# Patient Record
Sex: FEMALE | Race: WHITE | Employment: OTHER | URBAN - METROPOLITAN AREA
[De-identification: names, ages, dates, MRNs, and addresses within clinical notes are randomized per-mention and may not be internally consistent; named-entity substitution may affect disease eponyms.]

---

## 2017-03-20 RX ORDER — ATORVASTATIN CALCIUM 20 MG/1
TABLET, FILM COATED ORAL
Qty: 30 TABLET | Refills: 1 | Status: SHIPPED | OUTPATIENT
Start: 2017-03-20 | End: 2017-05-17 | Stop reason: SDUPTHER

## 2017-05-17 RX ORDER — ATORVASTATIN CALCIUM 20 MG/1
TABLET, FILM COATED ORAL
Qty: 30 TABLET | Refills: 0 | Status: SHIPPED | OUTPATIENT
Start: 2017-05-17 | End: 2017-05-19 | Stop reason: SDUPTHER

## 2017-05-19 ENCOUNTER — OFFICE VISIT (OUTPATIENT)
Dept: FAMILY MEDICINE CLINIC | Age: 62
End: 2017-05-19

## 2017-05-19 VITALS
DIASTOLIC BLOOD PRESSURE: 70 MMHG | HEART RATE: 72 BPM | SYSTOLIC BLOOD PRESSURE: 110 MMHG | WEIGHT: 144 LBS | BODY MASS INDEX: 24.59 KG/M2 | HEIGHT: 64 IN | OXYGEN SATURATION: 98 % | RESPIRATION RATE: 14 BRPM

## 2017-05-19 DIAGNOSIS — E78.5 HYPERLIPIDEMIA WITH TARGET LDL LESS THAN 100: ICD-10-CM

## 2017-05-19 DIAGNOSIS — Z00.00 WELL ADULT EXAM: Primary | ICD-10-CM

## 2017-05-19 PROCEDURE — 99396 PREV VISIT EST AGE 40-64: CPT | Performed by: FAMILY MEDICINE

## 2017-05-19 RX ORDER — ATORVASTATIN CALCIUM 20 MG/1
TABLET, FILM COATED ORAL
Qty: 30 TABLET | Refills: 11 | Status: SHIPPED | OUTPATIENT
Start: 2017-05-19 | End: 2018-04-30 | Stop reason: SDUPTHER

## 2017-05-19 ASSESSMENT — PATIENT HEALTH QUESTIONNAIRE - PHQ9
1. LITTLE INTEREST OR PLEASURE IN DOING THINGS: 0
SUM OF ALL RESPONSES TO PHQ9 QUESTIONS 1 & 2: 0
2. FEELING DOWN, DEPRESSED OR HOPELESS: 0
SUM OF ALL RESPONSES TO PHQ QUESTIONS 1-9: 0

## 2017-05-19 ASSESSMENT — ENCOUNTER SYMPTOMS
RESPIRATORY NEGATIVE: 1
GASTROINTESTINAL NEGATIVE: 1

## 2017-12-06 ENCOUNTER — HOSPITAL ENCOUNTER (OUTPATIENT)
Dept: WOMENS IMAGING | Age: 62
Discharge: HOME | End: 2017-12-06
Payer: COMMERCIAL

## 2017-12-06 DIAGNOSIS — Z12.31 VISIT FOR SCREENING MAMMOGRAM: ICD-10-CM

## 2017-12-06 PROCEDURE — 77063 BREAST TOMOSYNTHESIS BI: CPT

## 2018-04-30 ENCOUNTER — OFFICE VISIT (OUTPATIENT)
Dept: FAMILY MEDICINE CLINIC | Age: 63
End: 2018-04-30
Payer: COMMERCIAL

## 2018-04-30 VITALS
SYSTOLIC BLOOD PRESSURE: 118 MMHG | OXYGEN SATURATION: 98 % | RESPIRATION RATE: 16 BRPM | HEART RATE: 72 BPM | HEIGHT: 64 IN | BODY MASS INDEX: 24.01 KG/M2 | WEIGHT: 140.6 LBS | DIASTOLIC BLOOD PRESSURE: 70 MMHG

## 2018-04-30 DIAGNOSIS — Z00.00 WELL ADULT HEALTH CHECK: Primary | ICD-10-CM

## 2018-04-30 PROCEDURE — 99396 PREV VISIT EST AGE 40-64: CPT | Performed by: FAMILY MEDICINE

## 2018-04-30 RX ORDER — ATORVASTATIN CALCIUM 20 MG/1
TABLET, FILM COATED ORAL
Qty: 30 TABLET | Refills: 11 | Status: SHIPPED | OUTPATIENT
Start: 2018-04-30 | End: 2019-05-22 | Stop reason: SDUPTHER

## 2018-04-30 ASSESSMENT — ENCOUNTER SYMPTOMS
GASTROINTESTINAL NEGATIVE: 1
RESPIRATORY NEGATIVE: 1

## 2019-05-22 ENCOUNTER — OFFICE VISIT (OUTPATIENT)
Dept: FAMILY MEDICINE CLINIC | Age: 64
End: 2019-05-22
Payer: MEDICARE

## 2019-05-22 VITALS
DIASTOLIC BLOOD PRESSURE: 84 MMHG | RESPIRATION RATE: 12 BRPM | SYSTOLIC BLOOD PRESSURE: 136 MMHG | HEART RATE: 84 BPM | WEIGHT: 144.6 LBS | HEIGHT: 64 IN | BODY MASS INDEX: 24.69 KG/M2

## 2019-05-22 DIAGNOSIS — Z00.00 WELL ADULT HEALTH CHECK: Primary | ICD-10-CM

## 2019-05-22 PROCEDURE — G0439 PPPS, SUBSEQ VISIT: HCPCS | Performed by: FAMILY MEDICINE

## 2019-05-22 RX ORDER — ATORVASTATIN CALCIUM 20 MG/1
TABLET, FILM COATED ORAL
Qty: 30 TABLET | Refills: 11 | Status: SHIPPED | OUTPATIENT
Start: 2019-05-22 | End: 2020-04-02 | Stop reason: SDUPTHER

## 2019-05-22 ASSESSMENT — PATIENT HEALTH QUESTIONNAIRE - PHQ9
SUM OF ALL RESPONSES TO PHQ QUESTIONS 1-9: 0
SUM OF ALL RESPONSES TO PHQ9 QUESTIONS 1 & 2: 0
2. FEELING DOWN, DEPRESSED OR HOPELESS: 0
1. LITTLE INTEREST OR PLEASURE IN DOING THINGS: 0
SUM OF ALL RESPONSES TO PHQ QUESTIONS 1-9: 0

## 2019-05-22 ASSESSMENT — ENCOUNTER SYMPTOMS
RESPIRATORY NEGATIVE: 1
GASTROINTESTINAL NEGATIVE: 1

## 2019-05-22 NOTE — PROGRESS NOTES
Subjective:      Patient ID: Severo Alstrom is a 58 y.o. female. HPI:    Chief Complaint   Patient presents with    Annual Exam    Medication Refill    Results     Annual eval.  Doing very well overall. BP fine. BP Readings from Last 3 Encounters:   19 136/84   18 118/70   17 110/70     Weight stable. Wt Readings from Last 3 Encounters:   19 144 lb 9.6 oz (65.6 kg)   18 140 lb 9.6 oz (63.8 kg)   17 144 lb (65.3 kg)     Tolerating meds, compliant. Patient Active Problem List   Diagnosis    Hyperlipidemia with target LDL less than 100     Past Surgical History:   Procedure Laterality Date     SECTION       Prior to Admission medications    Medication Sig Start Date End Date Taking?  Authorizing Provider   atorvastatin (LIPITOR) 20 MG tablet TAKE 1 TABLET BY MOUTH AT NIGHT 18  Yes Yarelis Davis,        Lab Results   Component Value Date    LABA1C 5.9 2014     No results found for: EAG    No components found for: CHLPL  Lab Results   Component Value Date    TRIG 77 2019    TRIG 59 2018    TRIG 73 2017     Lab Results   Component Value Date    HDL 92 2019    HDL 99 2018     2017     Lab Results   Component Value Date    LDLCALC 69 2019    LDLCALC 66 2018    LDLCALC 39 2017     No results found for: LABVLDL      Chemistry        Component Value Date/Time     2019 0630    K 3.5 (L) 2019 0630     (L) 2019 0630    CO2 28 2019 0630    BUN 20 2019 0630    CREATININE 0.90 2019 0630        Component Value Date/Time    CALCIUM 9.6 2019 0630    ALKPHOS 52 2019 0630    AST 24 2019 0630    ALT 20 2019 0630    BILITOT 1.6 (H) 2019 0630            Lab Results   Component Value Date    TSH 2.900 10/06/2015       Lab Results   Component Value Date    WBC 5.5 2019    HGB 14.8 2019    HCT 44.2 (H) RTO annually        Earlene Robbins DO

## 2020-01-14 ENCOUNTER — HOSPITAL ENCOUNTER (OUTPATIENT)
Dept: WOMENS IMAGING | Age: 65
Discharge: HOME | End: 2020-01-14
Payer: MEDICARE

## 2020-01-14 ENCOUNTER — TELEPHONE (OUTPATIENT)
Dept: FAMILY MEDICINE CLINIC | Age: 65
End: 2020-01-14

## 2020-01-14 PROCEDURE — 77063 BREAST TOMOSYNTHESIS BI: CPT

## 2020-01-14 NOTE — TELEPHONE ENCOUNTER
Per Dr Iggy Servin, patient needs appt to discuss mammogram due to implants looking abnormal.    I spoke with patient and appt given

## 2020-01-16 ENCOUNTER — OFFICE VISIT (OUTPATIENT)
Dept: FAMILY MEDICINE CLINIC | Age: 65
End: 2020-01-16
Payer: MEDICARE

## 2020-01-16 VITALS
HEIGHT: 64 IN | BODY MASS INDEX: 24.87 KG/M2 | RESPIRATION RATE: 20 BRPM | HEART RATE: 96 BPM | SYSTOLIC BLOOD PRESSURE: 128 MMHG | WEIGHT: 145.7 LBS | DIASTOLIC BLOOD PRESSURE: 68 MMHG

## 2020-01-16 PROCEDURE — G8427 DOCREV CUR MEDS BY ELIG CLIN: HCPCS | Performed by: FAMILY MEDICINE

## 2020-01-16 PROCEDURE — 1123F ACP DISCUSS/DSCN MKR DOCD: CPT | Performed by: FAMILY MEDICINE

## 2020-01-16 PROCEDURE — 99213 OFFICE O/P EST LOW 20 MIN: CPT | Performed by: FAMILY MEDICINE

## 2020-01-16 PROCEDURE — 4040F PNEUMOC VAC/ADMIN/RCVD: CPT | Performed by: FAMILY MEDICINE

## 2020-01-16 PROCEDURE — G8484 FLU IMMUNIZE NO ADMIN: HCPCS | Performed by: FAMILY MEDICINE

## 2020-01-16 PROCEDURE — 1090F PRES/ABSN URINE INCON ASSESS: CPT | Performed by: FAMILY MEDICINE

## 2020-01-16 PROCEDURE — G8417 CALC BMI ABV UP PARAM F/U: HCPCS | Performed by: FAMILY MEDICINE

## 2020-01-16 PROCEDURE — 1036F TOBACCO NON-USER: CPT | Performed by: FAMILY MEDICINE

## 2020-01-16 PROCEDURE — 3017F COLORECTAL CA SCREEN DOC REV: CPT | Performed by: FAMILY MEDICINE

## 2020-01-16 PROCEDURE — G8400 PT W/DXA NO RESULTS DOC: HCPCS | Performed by: FAMILY MEDICINE

## 2020-01-16 SDOH — ECONOMIC STABILITY: TRANSPORTATION INSECURITY
IN THE PAST 12 MONTHS, HAS THE LACK OF TRANSPORTATION KEPT YOU FROM MEDICAL APPOINTMENTS OR FROM GETTING MEDICATIONS?: NO

## 2020-01-16 SDOH — ECONOMIC STABILITY: TRANSPORTATION INSECURITY
IN THE PAST 12 MONTHS, HAS LACK OF TRANSPORTATION KEPT YOU FROM MEETINGS, WORK, OR FROM GETTING THINGS NEEDED FOR DAILY LIVING?: NO

## 2020-01-16 SDOH — ECONOMIC STABILITY: FOOD INSECURITY: WITHIN THE PAST 12 MONTHS, THE FOOD YOU BOUGHT JUST DIDN'T LAST AND YOU DIDN'T HAVE MONEY TO GET MORE.: NEVER TRUE

## 2020-01-16 SDOH — ECONOMIC STABILITY: FOOD INSECURITY: WITHIN THE PAST 12 MONTHS, YOU WORRIED THAT YOUR FOOD WOULD RUN OUT BEFORE YOU GOT MONEY TO BUY MORE.: NEVER TRUE

## 2020-01-16 SDOH — ECONOMIC STABILITY: INCOME INSECURITY: HOW HARD IS IT FOR YOU TO PAY FOR THE VERY BASICS LIKE FOOD, HOUSING, MEDICAL CARE, AND HEATING?: NOT HARD AT ALL

## 2020-01-16 ASSESSMENT — PATIENT HEALTH QUESTIONNAIRE - PHQ9
SUM OF ALL RESPONSES TO PHQ9 QUESTIONS 1 & 2: 0
SUM OF ALL RESPONSES TO PHQ QUESTIONS 1-9: 0
SUM OF ALL RESPONSES TO PHQ QUESTIONS 1-9: 0
2. FEELING DOWN, DEPRESSED OR HOPELESS: 0
1. LITTLE INTEREST OR PLEASURE IN DOING THINGS: 0

## 2020-01-16 ASSESSMENT — ENCOUNTER SYMPTOMS
RESPIRATORY NEGATIVE: 1
GASTROINTESTINAL NEGATIVE: 1

## 2020-01-16 NOTE — PROGRESS NOTES
I spoke with Rocío Noe at Dr. Jhony Spencer office [phone] and pt is to call them herself to schedule the consult for breast implant removal. She has to pay $100 consult fee prior to being put on their schedule. No referral is needed.

## 2020-01-16 NOTE — PROGRESS NOTES
Subjective:      Patient ID: Skyler rOr is a 71 y.o. female. HPI:    Chief Complaint   Patient presents with    Results     mammogram     Pt here to discuss recent mammogram.  Mammogram showed possible intracapsular rupture of both implants. Implants were placed back in  by a surgeon in [place]. Pt does admit to some pain in the breast and some asymmetry. Patient Active Problem List   Diagnosis    Hyperlipidemia with target LDL less than 100     Past Surgical History:   Procedure Laterality Date     SECTION       Prior to Admission medications    Medication Sig Start Date End Date Taking? Authorizing Provider   atorvastatin (LIPITOR) 20 MG tablet TAKE 1 TABLET BY MOUTH AT NIGHT 19  Yes Carmine Caruso DO         Review of Systems   Constitutional: Negative. HENT: Negative. Respiratory: Negative. Cardiovascular: Negative. Gastrointestinal: Negative. Musculoskeletal: Negative. All other systems reviewed and are negative. Objective:   Physical Exam  Vitals signs and nursing note reviewed. Constitutional:       Appearance: She is well-developed. HENT:      Head: Normocephalic and atraumatic. Right Ear: Tympanic membrane normal.      Left Ear: Tympanic membrane normal.   Cardiovascular:      Rate and Rhythm: Normal rate and regular rhythm. Heart sounds: Normal heart sounds. No murmur. Pulmonary:      Effort: Pulmonary effort is normal.      Breath sounds: Normal breath sounds. Abdominal:      General: Bowel sounds are normal.      Palpations: Abdomen is soft. Skin:     General: Skin is warm and dry. Neurological:      Mental Status: She is alert and oriented to person, place, and time. Psychiatric:         Behavior: Behavior normal.         Assessment:       Diagnosis Orders   1.  Breast implant rupture, initial encounter             Plan:      -  Mammogram reviewed with pt  -  She will call Dr. Jose Miguel Ruth to set up a

## 2020-01-16 NOTE — PROGRESS NOTES
Visit Information    Have you changed or started any medications since your last visit including any over-the-counter medicines, vitamins, or herbal medicines? no   Are you having any side effects from any of your medications? -  no  Have you stopped taking any of your medications? Is so, why? -  no    Have you seen any other physician or provider since your last visit? No  Have you had any other diagnostic tests since your last visit? Yes - Records Obtained  Have you been seen in the emergency room and/or had an admission to a hospital since we last saw you? No  Have you had your routine dental cleaning in the past 6 months? yes - 1/7/2020    Have you activated your [facility] account? If not, what are your barriers?  Yes     Patient Care Team:  Nilda Whiteside DO as PCP - General (Family Medicine)  Nilda Whiteside DO as PCP - [facility] Provider  Derrell Mccrary MD (Dermatology)    Medical History Review  Past Medical, Family, and Social History reviewed and does not contribute to the patient presenting condition    Health Maintenance   Topic Date Due    Hepatitis C screen  1955    HIV screen  11/29/1969    Shingles Vaccine (2 of 3) 11/25/2015    A1C test (Diabetic or Prediabetic)  05/14/2016    Cervical cancer screen  05/07/2018    Flu vaccine (1) 09/01/2019    Pneumococcal 65+ years Vaccine (1 of 1 - PPSV23) 11/29/2019    DEXA (modify frequency per FRAX score)  11/29/2019    Lipid screen  04/06/2020    Annual Wellness Visit (AWV)  05/21/2020    Breast cancer screen  01/14/2022    DTaP/Tdap/Td vaccine (2 - Td) 09/30/2025    Colon cancer screen colonoscopy  05/24/2028

## 2020-01-21 ENCOUNTER — TELEPHONE (OUTPATIENT)
Dept: FAMILY MEDICINE CLINIC | Age: 65
End: 2020-01-21

## 2020-01-21 NOTE — TELEPHONE ENCOUNTER
Per Dr. Claudia Andujar patient is self pay and wishes to go to [facility]. [place] [id number] for MRI bilateral breast w and wo contrast.  CS spoke with Karina MRI breast is done at imaging center which is affiliated with [facility].  Imaging center address [address] [facility]. [address] [place] [address]. MRI is scheduled for 1/28/2020 12 noon arrival for 12:30pm  MRI bilateral breast.  Orders, OV notes and Mammogram faxed to the [address] [place] [address] at [phone]. Patient notified, voiced understanding.

## 2020-03-03 ENCOUNTER — HOSPITAL ENCOUNTER (OUTPATIENT)
Age: 65
Discharge: HOME | End: 2020-03-03
Payer: MEDICARE

## 2020-03-03 LAB
ANION GAP SERPL CALCULATED.3IONS-SCNC: 13 MEQ/L (ref 8–16)
BUN BLDV-MCNC: 23 MG/DL (ref 7–22)
CALCIUM SERPL-MCNC: 10.4 MG/DL (ref 8.5–10.5)
CHLORIDE BLD-SCNC: 100 MEQ/L (ref 98–111)
CO2: 27 MEQ/L (ref 23–33)
CREAT SERPL-MCNC: 0.7 MG/DL (ref 0.4–1.2)
EKG ATRIAL RATE: 77 BPM
EKG P AXIS: 79 DEGREES
EKG P-R INTERVAL: 142 MS
EKG Q-T INTERVAL: 416 MS
EKG QRS DURATION: 76 MS
EKG QTC CALCULATION (BAZETT): 470 MS
EKG R AXIS: 74 DEGREES
EKG T AXIS: 55 DEGREES
EKG VENTRICULAR RATE: 77 BPM
ERYTHROCYTE [DISTWIDTH] IN BLOOD BY AUTOMATED COUNT: 12.3 % (ref 11.5–14.5)
ERYTHROCYTE [DISTWIDTH] IN BLOOD BY AUTOMATED COUNT: 43 FL (ref 35–45)
GFR SERPL CREATININE-BSD FRML MDRD: 84 ML/MIN/1.73M2
GLUCOSE BLD-MCNC: 117 MG/DL (ref 70–108)
HCT VFR BLD CALC: 46.3 % (ref 37–47)
HEMOGLOBIN: 14.8 GM/DL (ref 12–16)
MCH RBC QN AUTO: 30.5 PG (ref 26–33)
MCHC RBC AUTO-ENTMCNC: 32 GM/DL (ref 32.2–35.5)
MCV RBC AUTO: 95.5 FL (ref 81–99)
PLATELET # BLD: 287 THOU/MM3 (ref 130–400)
PMV BLD AUTO: 10.1 FL (ref 9.4–12.4)
POTASSIUM SERPL-SCNC: 4 MEQ/L (ref 3.5–5.2)
RBC # BLD: 4.85 MILL/MM3 (ref 4.2–5.4)
SODIUM BLD-SCNC: 140 MEQ/L (ref 135–145)
WBC # BLD: 7.1 THOU/MM3 (ref 4.8–10.8)

## 2020-03-03 PROCEDURE — 85027 COMPLETE CBC AUTOMATED: CPT

## 2020-03-03 PROCEDURE — 36415 COLL VENOUS BLD VENIPUNCTURE: CPT

## 2020-03-03 PROCEDURE — 87081 CULTURE SCREEN ONLY: CPT

## 2020-03-03 PROCEDURE — 80048 BASIC METABOLIC PNL TOTAL CA: CPT

## 2020-03-03 PROCEDURE — 93005 ELECTROCARDIOGRAM TRACING: CPT | Performed by: SPECIALIST

## 2020-03-03 PROCEDURE — 93010 ELECTROCARDIOGRAM REPORT: CPT | Performed by: NUCLEAR MEDICINE

## 2020-03-05 LAB — MRSA SCREEN: NORMAL

## 2020-04-02 RX ORDER — ATORVASTATIN CALCIUM 20 MG/1
TABLET, FILM COATED ORAL
Qty: 30 TABLET | Refills: 11 | Status: SHIPPED | OUTPATIENT
Start: 2020-04-02 | End: 2020-04-06 | Stop reason: SDUPTHER

## 2020-04-02 NOTE — TELEPHONE ENCOUNTER
[facility]'S fax received, refill of Atorvastatin 20 mg tablets.

## 2020-04-06 RX ORDER — ATORVASTATIN CALCIUM 20 MG/1
TABLET, FILM COATED ORAL
Qty: 30 TABLET | Refills: 11 | Status: SHIPPED | OUTPATIENT
Start: 2020-04-06 | End: 2021-06-16 | Stop reason: SDUPTHER

## 2020-05-29 ENCOUNTER — HOSPITAL ENCOUNTER (OUTPATIENT)
Age: 65
Discharge: HOME | End: 2020-05-29
Payer: MEDICARE

## 2020-05-29 PROCEDURE — U0002 COVID-19 LAB TEST NON-CDC: HCPCS

## 2020-05-30 LAB
PERFORMING LAB: NORMAL
REPORT: NORMAL
SARS-COV-2: NOT DETECTED

## 2020-06-01 ENCOUNTER — HOSPITAL ENCOUNTER (OUTPATIENT)
Age: 65
Setting detail: OUTPATIENT SURGERY
Discharge: HOME | End: 2020-06-01
Attending: SPECIALIST | Admitting: SPECIALIST

## 2020-06-01 ENCOUNTER — ANESTHESIA (OUTPATIENT)
Dept: OPERATING ROOM | Age: 65
End: 2020-06-01

## 2020-06-01 ENCOUNTER — ANESTHESIA EVENT (OUTPATIENT)
Dept: OPERATING ROOM | Age: 65
End: 2020-06-01

## 2020-06-01 VITALS
RESPIRATION RATE: 9 BRPM | HEART RATE: 79 BPM | TEMPERATURE: 97.3 F | SYSTOLIC BLOOD PRESSURE: 125 MMHG | DIASTOLIC BLOOD PRESSURE: 72 MMHG | BODY MASS INDEX: 25.01 KG/M2 | OXYGEN SATURATION: 93 % | HEIGHT: 64 IN | WEIGHT: 146.5 LBS

## 2020-06-01 VITALS
SYSTOLIC BLOOD PRESSURE: 142 MMHG | OXYGEN SATURATION: 99 % | DIASTOLIC BLOOD PRESSURE: 94 MMHG | TEMPERATURE: 96.8 F | RESPIRATION RATE: 3 BRPM

## 2020-06-01 PROCEDURE — 3600000013 HC SURGERY LEVEL 3 ADDTL 15MIN: Performed by: SPECIALIST

## 2020-06-01 PROCEDURE — 7100000011 HC PHASE II RECOVERY - ADDTL 15 MIN: Performed by: SPECIALIST

## 2020-06-01 PROCEDURE — 7100000001 HC PACU RECOVERY - ADDTL 15 MIN: Performed by: SPECIALIST

## 2020-06-01 PROCEDURE — C1729 CATH, DRAINAGE: HCPCS | Performed by: SPECIALIST

## 2020-06-01 PROCEDURE — 6360000002 HC RX W HCPCS: Performed by: ANESTHESIOLOGY

## 2020-06-01 PROCEDURE — 2580000003 HC RX 258: Performed by: SPECIALIST

## 2020-06-01 PROCEDURE — 6360000002 HC RX W HCPCS: Performed by: NURSE ANESTHETIST, CERTIFIED REGISTERED

## 2020-06-01 PROCEDURE — 6360000002 HC RX W HCPCS: Performed by: SPECIALIST

## 2020-06-01 PROCEDURE — 3700000000 HC ANESTHESIA ATTENDED CARE: Performed by: SPECIALIST

## 2020-06-01 PROCEDURE — 88304 TISSUE EXAM BY PATHOLOGIST: CPT

## 2020-06-01 PROCEDURE — 2500000003 HC RX 250 WO HCPCS: Performed by: SPECIALIST

## 2020-06-01 PROCEDURE — 88305 TISSUE EXAM BY PATHOLOGIST: CPT

## 2020-06-01 PROCEDURE — 2500000003 HC RX 250 WO HCPCS: Performed by: NURSE ANESTHETIST, CERTIFIED REGISTERED

## 2020-06-01 PROCEDURE — 2720000010 HC SURG SUPPLY STERILE: Performed by: SPECIALIST

## 2020-06-01 PROCEDURE — 2709999900 HC NON-CHARGEABLE SUPPLY: Performed by: SPECIALIST

## 2020-06-01 PROCEDURE — 3600000003 HC SURGERY LEVEL 3 BASE: Performed by: SPECIALIST

## 2020-06-01 PROCEDURE — 7100000010 HC PHASE II RECOVERY - FIRST 15 MIN: Performed by: SPECIALIST

## 2020-06-01 PROCEDURE — 7100000000 HC PACU RECOVERY - FIRST 15 MIN: Performed by: SPECIALIST

## 2020-06-01 PROCEDURE — 3700000001 HC ADD 15 MINUTES (ANESTHESIA): Performed by: SPECIALIST

## 2020-06-01 DEVICE — SMOOTH ROUND MODERATE PLUS PROFILE GEL-FILLED BREAST IMPLANT, 300CC  SMOOTH ROUND SILICONE
Type: IMPLANTABLE DEVICE | Site: BREAST | Status: FUNCTIONAL
Brand: MENTOR MEMORYGEL BREAST IMPLANT

## 2020-06-01 RX ORDER — FENTANYL CITRATE 50 UG/ML
INJECTION, SOLUTION INTRAMUSCULAR; INTRAVENOUS PRN
Status: DISCONTINUED | OUTPATIENT
Start: 2020-06-01 | End: 2020-06-01 | Stop reason: SDUPTHER

## 2020-06-01 RX ORDER — LABETALOL 20 MG/4 ML (5 MG/ML) INTRAVENOUS SYRINGE
10 EVERY 10 MIN PRN
Status: DISCONTINUED | OUTPATIENT
Start: 2020-06-01 | End: 2020-06-01 | Stop reason: HOSPADM

## 2020-06-01 RX ORDER — ONDANSETRON 2 MG/ML
INJECTION INTRAMUSCULAR; INTRAVENOUS PRN
Status: DISCONTINUED | OUTPATIENT
Start: 2020-06-01 | End: 2020-06-01 | Stop reason: SDUPTHER

## 2020-06-01 RX ORDER — MIDAZOLAM HYDROCHLORIDE 1 MG/ML
INJECTION INTRAMUSCULAR; INTRAVENOUS PRN
Status: DISCONTINUED | OUTPATIENT
Start: 2020-06-01 | End: 2020-06-01 | Stop reason: SDUPTHER

## 2020-06-01 RX ORDER — PROPOFOL 10 MG/ML
INJECTION, EMULSION INTRAVENOUS PRN
Status: DISCONTINUED | OUTPATIENT
Start: 2020-06-01 | End: 2020-06-01 | Stop reason: SDUPTHER

## 2020-06-01 RX ORDER — GLYCOPYRROLATE 1 MG/5 ML
SYRINGE (ML) INTRAVENOUS PRN
Status: DISCONTINUED | OUTPATIENT
Start: 2020-06-01 | End: 2020-06-01 | Stop reason: SDUPTHER

## 2020-06-01 RX ORDER — MORPHINE SULFATE 2 MG/ML
2 INJECTION, SOLUTION INTRAMUSCULAR; INTRAVENOUS EVERY 5 MIN PRN
Status: DISCONTINUED | OUTPATIENT
Start: 2020-06-01 | End: 2020-06-01 | Stop reason: HOSPADM

## 2020-06-01 RX ORDER — NEOSTIGMINE METHYLSULFATE 5 MG/5 ML
SYRINGE (ML) INTRAVENOUS PRN
Status: DISCONTINUED | OUTPATIENT
Start: 2020-06-01 | End: 2020-06-01 | Stop reason: SDUPTHER

## 2020-06-01 RX ORDER — GENTAMICIN SULFATE 40 MG/ML
INJECTION, SOLUTION INTRAMUSCULAR; INTRAVENOUS PRN
Status: DISCONTINUED | OUTPATIENT
Start: 2020-06-01 | End: 2020-06-01 | Stop reason: ALTCHOICE

## 2020-06-01 RX ORDER — FENTANYL CITRATE 50 UG/ML
50 INJECTION, SOLUTION INTRAMUSCULAR; INTRAVENOUS EVERY 5 MIN PRN
Status: DISCONTINUED | OUTPATIENT
Start: 2020-06-01 | End: 2020-06-01 | Stop reason: HOSPADM

## 2020-06-01 RX ORDER — LIDOCAINE HYDROCHLORIDE 10 MG/ML
INJECTION, SOLUTION INFILTRATION; PERINEURAL PRN
Status: DISCONTINUED | OUTPATIENT
Start: 2020-06-01 | End: 2020-06-01 | Stop reason: SDUPTHER

## 2020-06-01 RX ORDER — SODIUM CHLORIDE 9 MG/ML
INJECTION, SOLUTION INTRAVENOUS
Status: COMPLETED | OUTPATIENT
Start: 2020-06-01 | End: 2020-06-01

## 2020-06-01 RX ORDER — BUPIVACAINE HYDROCHLORIDE 5 MG/ML
INJECTION, SOLUTION PERINEURAL PRN
Status: DISCONTINUED | OUTPATIENT
Start: 2020-06-01 | End: 2020-06-01 | Stop reason: ALTCHOICE

## 2020-06-01 RX ADMIN — PHENYLEPHRINE HYDROCHLORIDE 100 MCG: 10 INJECTION INTRAVENOUS at 08:55

## 2020-06-01 RX ADMIN — FENTANYL CITRATE 50 MCG: 50 INJECTION, SOLUTION INTRAMUSCULAR; INTRAVENOUS at 07:40

## 2020-06-01 RX ADMIN — MIDAZOLAM HYDROCHLORIDE 1 MG: 1 INJECTION, SOLUTION INTRAMUSCULAR; INTRAVENOUS at 07:41

## 2020-06-01 RX ADMIN — PHENYLEPHRINE HYDROCHLORIDE 100 MCG: 10 INJECTION INTRAVENOUS at 08:07

## 2020-06-01 RX ADMIN — CEFAZOLIN 2 G: 10 INJECTION, POWDER, FOR SOLUTION INTRAVENOUS at 07:43

## 2020-06-01 RX ADMIN — MIDAZOLAM HYDROCHLORIDE 1 MG: 1 INJECTION, SOLUTION INTRAMUSCULAR; INTRAVENOUS at 07:40

## 2020-06-01 RX ADMIN — PHENYLEPHRINE HYDROCHLORIDE 100 MCG: 10 INJECTION INTRAVENOUS at 08:46

## 2020-06-01 RX ADMIN — Medication 0.6 MG: at 09:24

## 2020-06-01 RX ADMIN — FENTANYL CITRATE 50 MCG: 50 INJECTION, SOLUTION INTRAMUSCULAR; INTRAVENOUS at 09:30

## 2020-06-01 RX ADMIN — PHENYLEPHRINE HYDROCHLORIDE 100 MCG: 10 INJECTION INTRAVENOUS at 07:46

## 2020-06-01 RX ADMIN — FENTANYL CITRATE 50 MCG: 50 INJECTION, SOLUTION INTRAMUSCULAR; INTRAVENOUS at 08:31

## 2020-06-01 RX ADMIN — Medication 3 MG: at 09:24

## 2020-06-01 RX ADMIN — FENTANYL CITRATE 50 MCG: 50 INJECTION, SOLUTION INTRAMUSCULAR; INTRAVENOUS at 07:42

## 2020-06-01 RX ADMIN — PROPOFOL 200 MG: 10 INJECTION, EMULSION INTRAVENOUS at 07:41

## 2020-06-01 RX ADMIN — SODIUM CHLORIDE: 9 INJECTION, SOLUTION INTRAVENOUS at 07:39

## 2020-06-01 RX ADMIN — PHENYLEPHRINE HYDROCHLORIDE 100 MCG: 10 INJECTION INTRAVENOUS at 07:48

## 2020-06-01 RX ADMIN — MORPHINE SULFATE 2 MG: 2 INJECTION, SOLUTION INTRAMUSCULAR; INTRAVENOUS at 09:57

## 2020-06-01 RX ADMIN — SODIUM CHLORIDE: 9 INJECTION, SOLUTION INTRAVENOUS at 09:13

## 2020-06-01 RX ADMIN — PHENYLEPHRINE HYDROCHLORIDE 100 MCG: 10 INJECTION INTRAVENOUS at 07:50

## 2020-06-01 RX ADMIN — PHENYLEPHRINE HYDROCHLORIDE 100 MCG: 10 INJECTION INTRAVENOUS at 08:44

## 2020-06-01 RX ADMIN — ONDANSETRON HYDROCHLORIDE 4 MG: 4 INJECTION, SOLUTION INTRAMUSCULAR; INTRAVENOUS at 09:19

## 2020-06-01 RX ADMIN — LIDOCAINE HYDROCHLORIDE 80 MG: 10 INJECTION, SOLUTION INFILTRATION; PERINEURAL at 07:41

## 2020-06-01 ASSESSMENT — PULMONARY FUNCTION TESTS
PIF_VALUE: 14
PIF_VALUE: 13
PIF_VALUE: 1
PIF_VALUE: 12
PIF_VALUE: 18
PIF_VALUE: 17
PIF_VALUE: 13
PIF_VALUE: 14
PIF_VALUE: 16
PIF_VALUE: 16
PIF_VALUE: 15
PIF_VALUE: 13
PIF_VALUE: 16
PIF_VALUE: 36
PIF_VALUE: 14
PIF_VALUE: 13
PIF_VALUE: 16
PIF_VALUE: 13
PIF_VALUE: 16
PIF_VALUE: 14
PIF_VALUE: 15
PIF_VALUE: 14
PIF_VALUE: 15
PIF_VALUE: 13
PIF_VALUE: 14
PIF_VALUE: 15
PIF_VALUE: 15
PIF_VALUE: 16
PIF_VALUE: 12
PIF_VALUE: 14
PIF_VALUE: 0
PIF_VALUE: 14
PIF_VALUE: 17
PIF_VALUE: 12
PIF_VALUE: 17
PIF_VALUE: 14
PIF_VALUE: 14
PIF_VALUE: 16
PIF_VALUE: 13
PIF_VALUE: 15
PIF_VALUE: 14
PIF_VALUE: 14
PIF_VALUE: 24
PIF_VALUE: 1
PIF_VALUE: 14
PIF_VALUE: 15
PIF_VALUE: 14
PIF_VALUE: 13
PIF_VALUE: 13
PIF_VALUE: 15
PIF_VALUE: 16
PIF_VALUE: 12
PIF_VALUE: 13
PIF_VALUE: 16
PIF_VALUE: 15
PIF_VALUE: 15
PIF_VALUE: 17
PIF_VALUE: 13
PIF_VALUE: 16
PIF_VALUE: 15
PIF_VALUE: 14
PIF_VALUE: 13
PIF_VALUE: 14
PIF_VALUE: 1
PIF_VALUE: 13
PIF_VALUE: 14
PIF_VALUE: 14
PIF_VALUE: 1
PIF_VALUE: 14
PIF_VALUE: 16
PIF_VALUE: 13
PIF_VALUE: 14
PIF_VALUE: 16
PIF_VALUE: 14
PIF_VALUE: 13
PIF_VALUE: 14
PIF_VALUE: 17
PIF_VALUE: 13
PIF_VALUE: 14
PIF_VALUE: 13
PIF_VALUE: 19
PIF_VALUE: 14
PIF_VALUE: 12
PIF_VALUE: 13
PIF_VALUE: 13
PIF_VALUE: 14
PIF_VALUE: 13
PIF_VALUE: 14
PIF_VALUE: 14
PIF_VALUE: 20
PIF_VALUE: 17
PIF_VALUE: 14
PIF_VALUE: 15
PIF_VALUE: 14
PIF_VALUE: 14

## 2020-06-01 ASSESSMENT — PAIN - FUNCTIONAL ASSESSMENT: PAIN_FUNCTIONAL_ASSESSMENT: 0-10

## 2020-06-01 ASSESSMENT — PAIN DESCRIPTION - LOCATION
LOCATION: BREAST
LOCATION: BREAST

## 2020-06-01 ASSESSMENT — PAIN DESCRIPTION - DESCRIPTORS
DESCRIPTORS: SORE
DESCRIPTORS: SORE

## 2020-06-01 ASSESSMENT — PAIN DESCRIPTION - PAIN TYPE
TYPE: SURGICAL PAIN
TYPE: SURGICAL PAIN

## 2020-06-01 ASSESSMENT — PAIN SCALES - GENERAL
PAINLEVEL_OUTOF10: 2
PAINLEVEL_OUTOF10: 5

## 2020-06-01 ASSESSMENT — PAIN DESCRIPTION - ORIENTATION
ORIENTATION: RIGHT;LEFT
ORIENTATION: RIGHT;LEFT

## 2020-06-01 NOTE — PROGRESS NOTES
8001: Patient entered into Phase I. Report received from surgical RN and CRNA. VSS, patient on 3L NC. Alert and talking. Denies pain. Surgical bra intact. Drain sites clean dry and intact. Small amount of drainage noted to L drain bulb and small amount of drainage noted to R drain tube. 0945: Patient weaned to RA. SpO2 98% with respirations even and unlabored. Pain 2/10 to bilateral breasts. 3370: Patient's pain 5/10 to bilateral breasts  0957: PRN Morphine given per order. VSS. 1005: Patient tolerating PO fluids. Denies N/V. Pain remains a 5/10. Repositioned in bed. 1015: VSS. Patient resting in bed. 1020: Patient completed Phase I and entered into Phase II. Repositioned for comfort. Pain remains a 5/10 to bilateral breasts. 1030: Patient resting in bed. No concerns voiced. 1040: IV capped. Patient repositioned. Drain sites remain clean dry and intact. L bulb with small amount of drainage noted. 1110: Patient up and dressed. Tolerated well. 1125: IV removed. No complications. 1130: Discharge instructions reviewed with patient and . Patient and  educated on draining bulb and recording sheet provided. Voiced understanding. 1135: Patient discharged in stable condition with .

## 2020-06-01 NOTE — OP NOTE
Operative Note    Patient name: Humphrey Haider             Medical Record Number: [id number]    Primary Care Physician: DO SHARON Garrett 1955    Date of Procedure: 2020    Pre-operative Diagnosis:  Unacceptable cosmetic appearance. Post-operative Diagnosis: Same    Procedure Performed:   1. Bilateral capsulectomy with implant exchange (breast augmentation) mammoplasty   ( number [id number] right and [id number] left)      Type: [place] MemoryGel Smooth Round Moderate Plus Profile   Size: 300cc   Placement:  Subglandular    Surgeons/Assistants: Dr. Graciela Carrero PA-C    Estimated Blood Loss:  11 ml    Complications: none immediately appreciated    Procedure: With the patient lying initially in the supine position, under adequate general anesthesia by the anesthesia team. After the SCDs were placed bilaterally the patient was administered IV antibiotics properly. The entire chest wall was draped in standard sterile fashion. Inframammary incision was made through the previous scar and dissection was carried out to gain access to the the subglandular capsule pocket. The capsule with intracapsular implants were completely removed without rupturing the capsule. A sizer was used to assure proper size after water displacement test and the 300cc implant was decided as the ideal implant for her appearance. 19Fr round closed suction drains were placed and secured with 2-0 silk sutures on each side. Both pockets were irrigated with triple antibiotic solution. Also bathed with 10 ml each of 0.5% Marcaine solution to help with postop pain control. The above stated implants were placed without difficulty using a no touch technique and a funnel.   The patient was placed in an upright position and minor modifications were made of the implant placement which was noted to be very symmetrical.  The closure was completed using a 3-0 Monocryl suture in interrupted fashion, closed in the subcutaneous and glandular tissue, placed in interrupted fashion and the final closure completed of the skin using 2-0 Quill. Bacitracin and xeroform was applied as a final coverage of the incisions and then bulky sterile dressings wee held in place with a surgical support bra. The patient tolerated the procedure well, she remained hemodynamically stable throughout the procedure. She was extubated and transported to the recovery room in stable condition. All sponge, needle, and instrument counts were correct. Jaden Cox  Electronically signed by me on 6/1/2020 at 9:36 AM  Operative Note      Patient: Corrinne Leyland  YOB: 1955  MRN: [id number]    Date of Procedure: 6/1/2020    Pre-Op Diagnosis: COSMETIC    Post-Op Diagnosis: Same       Procedure(s):  BILATERAL IMPLANT EXCHANGE WITH CAPSULECTOMIES    Surgeon(s):  Jaden Cox MD    Assistant:   Physician Assistant: Darya Ames PA-C    Anesthesia: General    Estimated Blood Loss (mL): Minimal    Complications: None    Specimens:   * No specimens in log *    Implants:  Implant Name Type Inv. Item Serial No.  Lot No. LRB No. Used Action   mentor gel breast implant    [id number] MENTOR WONG [id number] Right 1 Implanted   mentor memory gel breast implant    [id number] MENTOR WONG [id number] Left 1 Implanted         Drains:   Closed/Suction Drain Inferior;Right Breast Bulb 19 [place] (Active)       Closed/Suction Drain Inferior; Left Breast Bulb 19 [place] (Active)       Findings: Bilateral capsular contractions with suspected ruptured implants    Detailed Description of Procedure:   Bilateral capsulectomy with implant exchange (breast augmentation) mammoplasty      Electronically signed by Jaden Cox MD on 6/1/2020 at 9:29 AM

## 2020-06-01 NOTE — ANESTHESIA PRE PROCEDURE
Department of Anesthesiology  Preprocedure Note       Name:  Corrinne Leyland   Age:  71 y.o.  :  1955                                          MRN:  [id number]         Date:  2020      Surgeon: Demetrice Pretty):  Jaden Cox MD    Procedure: Procedure(s):  BILATERAL IMPLANT EXCHANGE WITH CAPSULECTOMIES    Medications prior to admission:   Prior to Admission medications    Medication Sig Start Date End Date Taking?  Authorizing Provider   atorvastatin (LIPITOR) 20 MG tablet TAKE 1 TABLET BY MOUTH AT NIGHT 20   Regan Tavera DO       Current medications:    Current Facility-Administered Medications   Medication Dose Route Frequency Provider Last Rate Last Dose    0.9 % sodium chloride infusion   Intravenous On Call to 56 Pennie Oneill MD        ceFAZolin (ANCEF) 2 g in dextrose 5 % 50 mL IVPB  2 g Intravenous On Call to 56 Pennie Oneill MD           Allergies:  No Known Allergies    Problem List:    Patient Active Problem List   Diagnosis Code    Hyperlipidemia with target LDL less than 100 E78.5       Past Medical History:        Diagnosis Date    Cancer ([facility] [address] 2018    skin    Hyperlipidemia        Past Surgical History:        Procedure Laterality Date    BREAST ENHANCEMENT SURGERY Bilateral      SECTION      COLONOSCOPY         Social History:    Social History     Tobacco Use    Smoking status: Never Smoker    Smokeless tobacco: Never Used   Substance Use Topics    Alcohol use: Yes     Comment: OCCASIONAL                                Counseling given: Not Answered      Vital Signs (Current):   Vitals:    20 1457 20 0658   BP:  (!) 145/75   Pulse:  82   Resp:  16   Temp:  97.4 °F (36.3 °C)   TempSrc:  Temporal   SpO2:  96%   Weight: 142 lb (64.4 kg) 146 lb 8 oz (66.5 kg)   Height: 5' 4\" (1.626 m) 5' 4\" (1.626 m)                                              BP Readings from Last 3 Encounters:   20 (!) 145/75   20 128/68   19 Neuro/Psych:               GI/Hepatic/Renal:             Endo/Other:                     Abdominal:           Vascular:                                        Anesthesia Plan      general     ASA 2       Induction: intravenous. MIPS: Postoperative opioids intended and Prophylactic antiemetics administered. Anesthetic plan and risks discussed with patient. Plan discussed with CRNA.                   Anson Guevara MD   6/1/2020

## 2021-06-16 ENCOUNTER — OFFICE VISIT (OUTPATIENT)
Dept: FAMILY MEDICINE CLINIC | Age: 66
End: 2021-06-16
Payer: MEDICARE

## 2021-06-16 VITALS
DIASTOLIC BLOOD PRESSURE: 68 MMHG | SYSTOLIC BLOOD PRESSURE: 120 MMHG | HEART RATE: 86 BPM | BODY MASS INDEX: 23.93 KG/M2 | WEIGHT: 140.2 LBS | HEIGHT: 64 IN | RESPIRATION RATE: 14 BRPM

## 2021-06-16 DIAGNOSIS — Z00.00 ROUTINE GENERAL MEDICAL EXAMINATION AT A HEALTH CARE FACILITY: Primary | ICD-10-CM

## 2021-06-16 DIAGNOSIS — Z78.0 POST-MENOPAUSAL: ICD-10-CM

## 2021-06-16 DIAGNOSIS — Z23 NEED FOR 23-POLYVALENT PNEUMOCOCCAL POLYSACCHARIDE VACCINE: ICD-10-CM

## 2021-06-16 DIAGNOSIS — E78.5 HYPERLIPIDEMIA WITH TARGET LDL LESS THAN 100: ICD-10-CM

## 2021-06-16 PROCEDURE — 1123F ACP DISCUSS/DSCN MKR DOCD: CPT | Performed by: FAMILY MEDICINE

## 2021-06-16 PROCEDURE — G0439 PPPS, SUBSEQ VISIT: HCPCS | Performed by: FAMILY MEDICINE

## 2021-06-16 PROCEDURE — 90732 PPSV23 VACC 2 YRS+ SUBQ/IM: CPT | Performed by: FAMILY MEDICINE

## 2021-06-16 PROCEDURE — 4040F PNEUMOC VAC/ADMIN/RCVD: CPT | Performed by: FAMILY MEDICINE

## 2021-06-16 PROCEDURE — 3017F COLORECTAL CA SCREEN DOC REV: CPT | Performed by: FAMILY MEDICINE

## 2021-06-16 PROCEDURE — G0009 ADMIN PNEUMOCOCCAL VACCINE: HCPCS | Performed by: FAMILY MEDICINE

## 2021-06-16 RX ORDER — ATORVASTATIN CALCIUM 20 MG/1
TABLET, FILM COATED ORAL
Qty: 30 TABLET | Refills: 11 | Status: SHIPPED | OUTPATIENT
Start: 2021-06-16 | End: 2022-06-16

## 2021-06-16 SDOH — ECONOMIC STABILITY: FOOD INSECURITY: WITHIN THE PAST 12 MONTHS, YOU WORRIED THAT YOUR FOOD WOULD RUN OUT BEFORE YOU GOT MONEY TO BUY MORE.: NEVER TRUE

## 2021-06-16 SDOH — ECONOMIC STABILITY: FOOD INSECURITY: WITHIN THE PAST 12 MONTHS, THE FOOD YOU BOUGHT JUST DIDN'T LAST AND YOU DIDN'T HAVE MONEY TO GET MORE.: NEVER TRUE

## 2021-06-16 ASSESSMENT — LIFESTYLE VARIABLES
HAVE YOU OR SOMEONE ELSE BEEN INJURED AS A RESULT OF YOUR DRINKING: 0
HOW OFTEN DURING THE LAST YEAR HAVE YOU FOUND THAT YOU WERE NOT ABLE TO STOP DRINKING ONCE YOU HAD STARTED: 0
AUDIT-C TOTAL SCORE: 1
HOW OFTEN DURING THE LAST YEAR HAVE YOU HAD A FEELING OF GUILT OR REMORSE AFTER DRINKING: 0
HOW MANY STANDARD DRINKS CONTAINING ALCOHOL DO YOU HAVE ON A TYPICAL DAY: 0
HOW OFTEN DURING THE LAST YEAR HAVE YOU NEEDED AN ALCOHOLIC DRINK FIRST THING IN THE MORNING TO GET YOURSELF GOING AFTER A NIGHT OF HEAVY DRINKING: 0
HOW OFTEN DO YOU HAVE SIX OR MORE DRINKS ON ONE OCCASION: 0
HOW OFTEN DURING THE LAST YEAR HAVE YOU BEEN UNABLE TO REMEMBER WHAT HAPPENED THE NIGHT BEFORE BECAUSE YOU HAD BEEN DRINKING: 0
AUDIT TOTAL SCORE: 1
HOW OFTEN DO YOU HAVE A DRINK CONTAINING ALCOHOL: 1
HOW OFTEN DURING THE LAST YEAR HAVE YOU FAILED TO DO WHAT WAS NORMALLY EXPECTED FROM YOU BECAUSE OF DRINKING: 0
HAS A RELATIVE, FRIEND, DOCTOR, OR ANOTHER HEALTH PROFESSIONAL EXPRESSED CONCERN ABOUT YOUR DRINKING OR SUGGESTED YOU CUT DOWN: 0

## 2021-06-16 ASSESSMENT — PATIENT HEALTH QUESTIONNAIRE - PHQ9
SUM OF ALL RESPONSES TO PHQ QUESTIONS 1-9: 0
1. LITTLE INTEREST OR PLEASURE IN DOING THINGS: 0
SUM OF ALL RESPONSES TO PHQ QUESTIONS 1-9: 0
SUM OF ALL RESPONSES TO PHQ9 QUESTIONS 1 & 2: 0
SUM OF ALL RESPONSES TO PHQ QUESTIONS 1-9: 0
2. FEELING DOWN, DEPRESSED OR HOPELESS: 0

## 2021-06-16 ASSESSMENT — SOCIAL DETERMINANTS OF HEALTH (SDOH): HOW HARD IS IT FOR YOU TO PAY FOR THE VERY BASICS LIKE FOOD, HOUSING, MEDICAL CARE, AND HEATING?: NOT HARD AT ALL

## 2021-06-16 ASSESSMENT — ENCOUNTER SYMPTOMS
GASTROINTESTINAL NEGATIVE: 1
RESPIRATORY NEGATIVE: 1

## 2021-06-16 NOTE — PATIENT INSTRUCTIONS
Personalized Preventive Plan for Selvin Gongora - 6/16/2021  Medicare offers a range of preventive health benefits. Some of the tests and screenings are paid in full while other may be subject to a deductible, co-insurance, and/or copay. Some of these benefits include a comprehensive review of your medical history including lifestyle, illnesses that may run in your family, and various assessments and screenings as appropriate. After reviewing your medical record and screening and assessments performed today your provider may have ordered immunizations, labs, imaging, and/or referrals for you. A list of these orders (if applicable) as well as your Preventive Care list are included within your After Visit Summary for your review. Other Preventive Recommendations:    · A preventive eye exam performed by an eye specialist is recommended every 1-2 years to screen for glaucoma; cataracts, macular degeneration, and other eye disorders. · A preventive dental visit is recommended every 6 months. · Try to get at least 150 minutes of exercise per week or 10,000 steps per day on a pedometer . · Order or download the FREE \"Exercise & Physical Activity: Your Everyday Guide\" from [employer]. Call 3-917.386.5721 or search [employer] online. · You need 0162-7056 mg of calcium and 3937-3060 IU of vitamin D per day. It is possible to meet your calcium requirement with diet alone, but a vitamin D supplement is usually necessary to meet this goal.  · When exposed to the sun, use a sunscreen that protects against both UVA and UVB radiation with an SPF of 30 or greater. Reapply every 2 to 3 hours or after sweating, drying off with a towel, or swimming. · Always wear a seat belt when traveling in a car. Always wear a helmet when riding a bicycle or motorcycle.

## 2021-06-16 NOTE — PROGRESS NOTES
2021    Madie Garcia (:  1955) is a 76 y.o. female, here for a preventive medicine evaluation. Chief Complaint   Patient presents with   Jesus Ochoa Medicare AWV     AWV. Doing very well. BPs and weight stable. BP Readings from Last 3 Encounters:   21 120/68   20 125/72   20 (!) 142/94     Wt Readings from Last 3 Encounters:   21 140 lb 3.2 oz (63.6 kg)   20 146 lb 8 oz (66.5 kg)   20 145 lb 11.2 oz (66.1 kg)       Patient Active Problem List   Diagnosis    Hyperlipidemia with target LDL less than 100       Review of Systems   Constitutional: Negative. HENT: Negative. Respiratory: Negative. Cardiovascular: Negative. Gastrointestinal: Negative. Musculoskeletal: Negative. All other systems reviewed and are negative. Prior to Visit Medications    Medication Sig Taking?  Authorizing Provider   atorvastatin (LIPITOR) 20 MG tablet TAKE 1 TABLET BY MOUTH AT NIGHT Yes Nicki Vasquez, DO        No Known Allergies    Past Medical History:   Diagnosis Date    Cancer ([facility] [address] 2018    skin    Hyperlipidemia        Past Surgical History:   Procedure Laterality Date    BREAST ENHANCEMENT SURGERY Bilateral     BREAST ENHANCEMENT SURGERY Bilateral 2020    BILATERAL IMPLANT EXCHANGE WITH CAPSULECTOMIES performed by Karsten Youngblood MD at [address] [place] [address] Dr Sharita ONEIL         Social History     Socioeconomic History    Marital status:      Spouse name: Young Rea Number of children: 2    Years of education: 15    Highest education level: High school graduate   Occupational History    Not on file   Tobacco Use    Smoking status: Never Smoker    Smokeless tobacco: Never Used   Substance and Sexual Activity    Alcohol use: Yes     Comment: OCCASIONAL    Drug use: Never    Sexual activity: Not on file   Other Topics Concern    Not on file   Social History Narrative    Not on file Social Determinants of Health     Financial Resource Strain: Low Risk     Difficulty of Paying Living Expenses: Not hard at all   Food Insecurity: No Food Insecurity    Worried About Running Out of Food in the Last Year: Never true    Jorgito of Food in the Last Year: Never true   Transportation Needs:     Lack of Transportation (Medical):  Lack of Transportation (Non-Medical):    Physical Activity:     Days of Exercise per Week:     Minutes of Exercise per Session:    Stress:     Feeling of Stress :    Social Connections:     Frequency of Communication with Friends and Family:     Frequency of Social Gatherings with Friends and Family:     Attends Confucianist Services:     Active Member of Clubs or Organizations:     Attends Club or Organization Meetings:     Marital Status:    Intimate Partner Violence:     Fear of Current or Ex-Partner:     Emotionally Abused:     Physically Abused:     Sexually Abused:         Family History   Problem Relation Age of Onset    Cancer Mother         colon    Cancer Father         lung, colon, and prostate    Diabetes Father     COPD Father        ADVANCE DIRECTIVE: N, <no information>    Vitals:    06/16/21 1019   BP: 120/68   Pulse: 86   Resp: 14   Weight: 140 lb 3.2 oz (63.6 kg)   Height: 5' 4\" (1.626 m)     Estimated body mass index is 24.07 kg/m² as calculated from the following:    Height as of this encounter: 5' 4\" (1.626 m). Weight as of this encounter: 140 lb 3.2 oz (63.6 kg). Physical Exam  Vitals and nursing note reviewed. Constitutional:       General: She is not in acute distress. Appearance: Normal appearance. She is well-developed. HENT:      Head: Normocephalic and atraumatic. Right Ear: Tympanic membrane normal.      Left Ear: Tympanic membrane normal.   Eyes:      Conjunctiva/sclera: Conjunctivae normal.   Cardiovascular:      Rate and Rhythm: Normal rate and regular rhythm. Heart sounds: Normal heart sounds.  No murmur heard. Pulmonary:      Effort: Pulmonary effort is normal.      Breath sounds: Normal breath sounds. No wheezing, rhonchi or rales. Abdominal:      General: There is no distension. Musculoskeletal:      Cervical back: Neck supple. Skin:     General: Skin is warm and dry. Findings: No rash (on exposed surfaces). Neurological:      General: No focal deficit present. Mental Status: She is alert. Psychiatric:         Attention and Perception: Attention normal.         Mood and Affect: Mood normal.         Speech: Speech normal.         Behavior: Behavior normal. Behavior is cooperative. Thought Content: Thought content normal.         Judgment: Judgment normal.         No flowsheet data found.     Lab Results   Component Value Date    CHOL 176 04/06/2019    CHOL 177 04/07/2018    CHOL 159 04/01/2017    CHOL 155 09/25/2014    CHOL 262 05/08/2014    TRIG 77 04/06/2019    TRIG 59 04/07/2018    TRIG 73 04/01/2017    HDL 92 04/06/2019    HDL 99 04/07/2018     04/01/2017    LDLCALC 69 04/06/2019    LDLCALC 66 04/07/2018    LDLCALC 39 04/01/2017    GLUCOSE 117 03/03/2020    GLUCOSE 106 04/06/2019    LABA1C 5.9 05/06/2014       The 10-year ASCVD risk score (Tamiko Francisco, et al., 2013) is: 4.3%    Values used to calculate the score:      Age: 76 years      Sex: Female      Is Non- : No      Diabetic: No      Tobacco smoker: No      Systolic Blood Pressure: 061 mmHg      Is BP treated: No      HDL Cholesterol: 92 mg/dL      Total Cholesterol: 176 mg/dL    Immunization History   Administered Date(s) Administered    COVID-19, J&J, PF, 0.5 mL 03/31/2021    Pneumococcal Polysaccharide ([id number]) 06/16/2021    Tdap (Boostrix, Adacel) 09/30/2015    Zoster Live (Zostavax) 09/30/2015       Health Maintenance   Topic Date Due    Hepatitis C screen  Never done    DEXA (modify frequency per FRAX score)  Never done    Shingles Vaccine (2 of 3) 11/25/2015    A1C test (Diabetic or Prediabetic)  2016    Annual Wellness Visit (AWV)  Never done    Lipid screen  2020    Flu vaccine (Season Ended) 2021    Breast cancer screen  2022    DTaP/Tdap/Td vaccine (2 - Td or Tdap) 2025    Colon cancer screen colonoscopy  2028    Pneumococcal 65+ years Vaccine  Completed    COVID-19 Vaccine  Completed    Hepatitis A vaccine  Aged Out    Hepatitis B vaccine  Aged Out    Hib vaccine  Aged Out    Meningococcal (ACWY) vaccine  Aged Out          ASSESSMENT/PLAN:  1. Routine general medical examination at a health care facility  2. Post-menopausal  3. Hyperlipidemia with target LDL less than 100  -     atorvastatin (LIPITOR) 20 MG tablet; TAKE 1 TABLET BY MOUTH AT NIGHT, Disp-30 tablet, R-11Normal  4. Need for 23-polyvalent pneumococcal polysaccharide vaccine  -     Pneumococcal polysaccharide vaccine 23-valent greater than or equal to 1yo subcutaneous/IM    -  Healthy lifestyle discussed  -  Chronic medical problems stable  -  Continue current medications  -  [facility]  -  Declines DEXA  -  Mammogram UTD  -  Pneumovax-23 today    Return for Medicare Annual Wellness Visit in 1 year. An electronic signature was used to authenticate this note. --Veronica Riojas, DO on 2021 at 10:58 AM    Medicare Annual Wellness Visit  Name: Aundrea Chambers Date: 2021   MRN: [id number] Sex: Female   Age: 76 y.o. Ethnicity: Non-/Non    : 1955 Race: Ksenia Quinones is here for Medicare AWV    Screenings for behavioral, psychosocial and functional/safety risks, and cognitive dysfunction are all negative except as indicated below. These results, as well as other patient data from the [employer] [address] form, are documented in Flowsheets linked to this Encounter. No Known Allergies      Prior to Visit Medications    Medication Sig Taking?  Authorizing Provider atorvastatin (LIPITOR) 20 MG tablet TAKE 1 TABLET BY MOUTH AT NIGHT Yes Ronda Andrea DO         Past Medical History:   Diagnosis Date    Cancer [facility]) 01/2018    skin    Hyperlipidemia        Past Surgical History:   Procedure Laterality Date    BREAST ENHANCEMENT SURGERY Bilateral 1980    BREAST ENHANCEMENT SURGERY Bilateral 6/1/2020    BILATERAL IMPLANT EXCHANGE WITH CAPSULECTOMIES performed by Steve Vanegas MD at [address] [place] [address] Dr Sharita ONEIL           Family History   Problem Relation Age of Onset    Cancer Mother         colon    Cancer Father         lung, colon, and prostate    Diabetes Father     COPD Father        CareTeam (Including outside providers/suppliers regularly involved in providing care):   Patient Care Team:  Ronda Andrea DO as PCP - General (Family Medicine)  Ronda Andrea DO as PCP - [facility] Empaneled Provider  Aydee Reilly MD (Dermatology)    Wt Readings from Last 3 Encounters:   06/16/21 140 lb 3.2 oz (63.6 kg)   06/01/20 146 lb 8 oz (66.5 kg)   01/16/20 145 lb 11.2 oz (66.1 kg)     Vitals:    06/16/21 1019   BP: 120/68   Pulse: 86   Resp: 14   Weight: 140 lb 3.2 oz (63.6 kg)   Height: 5' 4\" (1.626 m)     Body mass index is 24.07 kg/m². Based upon direct observation of the patient, evaluation of cognition reveals recent and remote memory intact. Patient's complete Health Risk Assessment and screening values have been reviewed and are found in Flowsheets. The following problems were reviewed today and where indicated follow up appointments were made and/or referrals ordered. Positive Risk Factor Screenings with Interventions:            General Health and ACP:  General  In general, how would you say your health is?: Excellent  In the past 7 days, have you experienced any of the following?  New or Increased Pain, New or Increased Fatigue, Loneliness, Social Isolation, Stress or Anger?: None of These  Do you get the social and emotional support that you need?: Yes  Do you have a Living Will?: Yes  Advance Directives     Power of [address] Will ACP-Advance Directive ACP-Power of     Not on File Not on File Not on File Not on File      General Health Risk Interventions:  · NA        Personalized Preventive Plan   Current Health Maintenance Status  Immunization History   Administered Date(s) Administered    COVID-19, J&J, PF, 0.5 mL 03/31/2021    Pneumococcal Polysaccharide ([id number]) 06/16/2021    Tdap (Boostrix, Adacel) 09/30/2015    Zoster Live (Zostavax) 09/30/2015        Health Maintenance   Topic Date Due    Hepatitis C screen  Never done    DEXA (modify frequency per FRAX score)  Never done    Shingles Vaccine (2 of 3) 11/25/2015    A1C test (Diabetic or Prediabetic)  05/14/2016    Annual Wellness Visit (AWV)  Never done    Lipid screen  04/06/2020    Flu vaccine (Season Ended) 09/01/2021    Breast cancer screen  01/14/2022    DTaP/Tdap/Td vaccine (2 - Td or Tdap) 09/30/2025    Colon cancer screen colonoscopy  05/24/2028    Pneumococcal 65+ years Vaccine  Completed    COVID-19 Vaccine  Completed    Hepatitis A vaccine  Aged Out    Hepatitis B vaccine  Aged Out    Hib vaccine  Aged Out    Meningococcal (ACWY) vaccine  Aged Out     Recommendations for Zappli Due: see orders and patient instructions/AVS.  . Recommended screening schedule for the next 5-10 years is provided to the patient in written form: see Patient Terese Douglas was seen today for medicare awv.     Diagnoses and all orders for this visit:    Routine general medical examination at a health care facility    Post-menopausal    Hyperlipidemia with target LDL less than 100  -     atorvastatin (LIPITOR) 20 MG tablet; TAKE 1 TABLET BY MOUTH AT NIGHT    Need for 23-polyvalent pneumococcal polysaccharide vaccine  -     Pneumococcal polysaccharide vaccine 23-valent greater than or equal to 3yo subcutaneous/IM

## 2021-06-16 NOTE — PROGRESS NOTES
Immunizations Administered     Name Date Dose Route    Pneumococcal Polysaccharide ([id number]) 6/16/2021 0.5 mL Intramuscular    Site: Deltoid- Left    Lot: [id number]    NDC: 1242-8869-91          Patient was given Pneumovax 23 0.5ml IM in left deltoid per v.o. Dr Drew Landau. NDC# 9905-4290-83. Patient tolerated well and without incident. VIS given and reviewed. ABN signed.

## 2021-10-18 ENCOUNTER — TELEPHONE (OUTPATIENT)
Dept: FAMILY MEDICINE CLINIC | Age: 66
End: 2021-10-18

## 2021-12-08 ENCOUNTER — TELEPHONE (OUTPATIENT)
Dept: FAMILY MEDICINE CLINIC | Age: 66
End: 2021-12-08

## 2021-12-08 NOTE — TELEPHONE ENCOUNTER
The patient called in stating that she had the J&J covid vaccine and is wanting to know what booster Dr. Ada Granger would recommend for her. Please advise.

## 2022-06-16 DIAGNOSIS — E78.5 HYPERLIPIDEMIA WITH TARGET LDL LESS THAN 100: ICD-10-CM

## 2022-06-16 RX ORDER — ATORVASTATIN CALCIUM 20 MG/1
TABLET, FILM COATED ORAL
Qty: 30 TABLET | Refills: 0 | Status: SHIPPED | OUTPATIENT
Start: 2022-06-16 | End: 2022-06-20

## 2022-06-19 DIAGNOSIS — E78.5 HYPERLIPIDEMIA WITH TARGET LDL LESS THAN 100: ICD-10-CM

## 2022-06-20 RX ORDER — ATORVASTATIN CALCIUM 20 MG/1
TABLET, FILM COATED ORAL
Qty: 30 TABLET | Refills: 0 | Status: SHIPPED | OUTPATIENT
Start: 2022-06-20 | End: 2022-09-01

## 2022-08-31 SDOH — HEALTH STABILITY: PHYSICAL HEALTH: ON AVERAGE, HOW MANY DAYS PER WEEK DO YOU ENGAGE IN MODERATE TO STRENUOUS EXERCISE (LIKE A BRISK WALK)?: 3 DAYS

## 2022-08-31 SDOH — HEALTH STABILITY: PHYSICAL HEALTH: ON AVERAGE, HOW MANY MINUTES DO YOU ENGAGE IN EXERCISE AT THIS LEVEL?: 20 MIN

## 2022-08-31 ASSESSMENT — PATIENT HEALTH QUESTIONNAIRE - PHQ9
SUM OF ALL RESPONSES TO PHQ QUESTIONS 1-9: 0
SUM OF ALL RESPONSES TO PHQ9 QUESTIONS 1 & 2: 0
SUM OF ALL RESPONSES TO PHQ QUESTIONS 1-9: 0
1. LITTLE INTEREST OR PLEASURE IN DOING THINGS: 0
2. FEELING DOWN, DEPRESSED OR HOPELESS: 0
SUM OF ALL RESPONSES TO PHQ QUESTIONS 1-9: 0
SUM OF ALL RESPONSES TO PHQ QUESTIONS 1-9: 0

## 2022-08-31 ASSESSMENT — LIFESTYLE VARIABLES
HAVE YOU OR SOMEONE ELSE BEEN INJURED AS A RESULT OF YOUR DRINKING: NO
HAS A RELATIVE, FRIEND, DOCTOR, OR ANOTHER HEALTH PROFESSIONAL EXPRESSED CONCERN ABOUT YOUR DRINKING OR SUGGESTED YOU CUT DOWN: NO
HOW OFTEN DURING THE LAST YEAR HAVE YOU HAD A FEELING OF GUILT OR REMORSE AFTER DRINKING: NEVER
HOW OFTEN DURING THE LAST YEAR HAVE YOU NEEDED AN ALCOHOLIC DRINK FIRST THING IN THE MORNING TO GET YOURSELF GOING AFTER A NIGHT OF HEAVY DRINKING: 0
HOW OFTEN DURING THE LAST YEAR HAVE YOU FAILED TO DO WHAT WAS NORMALLY EXPECTED FROM YOU BECAUSE OF DRINKING: NEVER
HOW OFTEN DURING THE LAST YEAR HAVE YOU NEEDED AN ALCOHOLIC DRINK FIRST THING IN THE MORNING TO GET YOURSELF GOING AFTER A NIGHT OF HEAVY DRINKING: NEVER
HOW OFTEN DURING THE LAST YEAR HAVE YOU BEEN UNABLE TO REMEMBER WHAT HAPPENED THE NIGHT BEFORE BECAUSE YOU HAD BEEN DRINKING: 0
HOW OFTEN DO YOU HAVE A DRINK CONTAINING ALCOHOL: 3
HOW OFTEN DURING THE LAST YEAR HAVE YOU FOUND THAT YOU WERE NOT ABLE TO STOP DRINKING ONCE YOU HAD STARTED: NEVER
HOW OFTEN DURING THE LAST YEAR HAVE YOU HAD A FEELING OF GUILT OR REMORSE AFTER DRINKING: 0
HAVE YOU OR SOMEONE ELSE BEEN INJURED AS A RESULT OF YOUR DRINKING: 0
HOW MANY STANDARD DRINKS CONTAINING ALCOHOL DO YOU HAVE ON A TYPICAL DAY: 3 OR 4
HOW OFTEN DURING THE LAST YEAR HAVE YOU BEEN UNABLE TO REMEMBER WHAT HAPPENED THE NIGHT BEFORE BECAUSE YOU HAD BEEN DRINKING: NEVER
HOW MANY STANDARD DRINKS CONTAINING ALCOHOL DO YOU HAVE ON A TYPICAL DAY: 2
HOW OFTEN DURING THE LAST YEAR HAVE YOU FOUND THAT YOU WERE NOT ABLE TO STOP DRINKING ONCE YOU HAD STARTED: 0
HOW OFTEN DO YOU HAVE A DRINK CONTAINING ALCOHOL: 2-4 TIMES A MONTH
HOW OFTEN DURING THE LAST YEAR HAVE YOU FAILED TO DO WHAT WAS NORMALLY EXPECTED FROM YOU BECAUSE OF DRINKING: 0
HAS A RELATIVE, FRIEND, DOCTOR, OR ANOTHER HEALTH PROFESSIONAL EXPRESSED CONCERN ABOUT YOUR DRINKING OR SUGGESTED YOU CUT DOWN: 0
HOW OFTEN DO YOU HAVE SIX OR MORE DRINKS ON ONE OCCASION: 2

## 2022-09-01 ENCOUNTER — OFFICE VISIT (OUTPATIENT)
Dept: FAMILY MEDICINE CLINIC | Age: 67
End: 2022-09-01
Payer: MEDICARE

## 2022-09-01 VITALS
RESPIRATION RATE: 15 BRPM | WEIGHT: 146.6 LBS | SYSTOLIC BLOOD PRESSURE: 128 MMHG | HEART RATE: 105 BPM | HEIGHT: 64 IN | BODY MASS INDEX: 25.03 KG/M2 | DIASTOLIC BLOOD PRESSURE: 78 MMHG

## 2022-09-01 DIAGNOSIS — Z12.31 ENCOUNTER FOR SCREENING MAMMOGRAM FOR MALIGNANT NEOPLASM OF BREAST: ICD-10-CM

## 2022-09-01 DIAGNOSIS — Z00.00 MEDICARE ANNUAL WELLNESS VISIT, SUBSEQUENT: Primary | ICD-10-CM

## 2022-09-01 DIAGNOSIS — Z78.0 POST-MENOPAUSAL: ICD-10-CM

## 2022-09-01 DIAGNOSIS — E78.5 HYPERLIPIDEMIA WITH TARGET LDL LESS THAN 100: ICD-10-CM

## 2022-09-01 PROCEDURE — 1123F ACP DISCUSS/DSCN MKR DOCD: CPT | Performed by: FAMILY MEDICINE

## 2022-09-01 PROCEDURE — 3017F COLORECTAL CA SCREEN DOC REV: CPT | Performed by: FAMILY MEDICINE

## 2022-09-01 PROCEDURE — G0439 PPPS, SUBSEQ VISIT: HCPCS | Performed by: FAMILY MEDICINE

## 2022-09-01 RX ORDER — ATORVASTATIN CALCIUM 20 MG/1
TABLET, FILM COATED ORAL
Qty: 90 TABLET | Refills: 3 | Status: CANCELLED | OUTPATIENT
Start: 2022-09-01

## 2022-09-01 SDOH — ECONOMIC STABILITY: FOOD INSECURITY: WITHIN THE PAST 12 MONTHS, THE FOOD YOU BOUGHT JUST DIDN'T LAST AND YOU DIDN'T HAVE MONEY TO GET MORE.: NEVER TRUE

## 2022-09-01 SDOH — ECONOMIC STABILITY: FOOD INSECURITY: WITHIN THE PAST 12 MONTHS, YOU WORRIED THAT YOUR FOOD WOULD RUN OUT BEFORE YOU GOT MONEY TO BUY MORE.: NEVER TRUE

## 2022-09-01 ASSESSMENT — SOCIAL DETERMINANTS OF HEALTH (SDOH): HOW HARD IS IT FOR YOU TO PAY FOR THE VERY BASICS LIKE FOOD, HOUSING, MEDICAL CARE, AND HEATING?: NOT HARD AT ALL

## 2022-09-01 ASSESSMENT — ENCOUNTER SYMPTOMS
RESPIRATORY NEGATIVE: 1
GASTROINTESTINAL NEGATIVE: 1

## 2022-09-01 NOTE — PATIENT INSTRUCTIONS
Personalized Preventive Plan for Farooq Diaz - 9/1/2022  Medicare offers a range of preventive health benefits. Some of the tests and screenings are paid in full while other may be subject to a deductible, co-insurance, and/or copay. Some of these benefits include a comprehensive review of your medical history including lifestyle, illnesses that may run in your family, and various assessments and screenings as appropriate. After reviewing your medical record and screening and assessments performed today your provider may have ordered immunizations, labs, imaging, and/or referrals for you. A list of these orders (if applicable) as well as your Preventive Care list are included within your After Visit Summary for your review. Other Preventive Recommendations:    A preventive eye exam performed by an eye specialist is recommended every 1-2 years to screen for glaucoma; cataracts, macular degeneration, and other eye disorders. A preventive dental visit is recommended every 6 months. Try to get at least 150 minutes of exercise per week or 10,000 steps per day on a pedometer . Order or download the FREE \"Exercise & Physical Activity: Your Everyday Guide\" from [employer]. Call 4-767.876.2549 or search [employer] online. You need 5500-4466 mg of calcium and 6514-6697 IU of vitamin D per day. It is possible to meet your calcium requirement with diet alone, but a vitamin D supplement is usually necessary to meet this goal.  When exposed to the sun, use a sunscreen that protects against both UVA and UVB radiation with an SPF of 30 or greater. Reapply every 2 to 3 hours or after sweating, drying off with a towel, or swimming. Always wear a seat belt when traveling in a car. Always wear a helmet when riding a bicycle or motorcycle.

## 2022-09-01 NOTE — PROGRESS NOTES
2022    Andrea Reynolds (:  1955) is a 78 y.o. female, here for a preventive medicine evaluation. Chief Complaint   Patient presents with    Medicare AWV     AWV. Doing well overall. BP Readings from Last 3 Encounters:   22 128/78   21 120/68   20 125/72     Wt Readings from Last 3 Encounters:   22 146 lb 9.6 oz (66.5 kg)   21 140 lb 3.2 oz (63.6 kg)   20 146 lb 8 oz (66.5 kg)         Patient Active Problem List   Diagnosis    Hyperlipidemia with target LDL less than 100       Review of Systems   Constitutional: Negative. HENT: Negative. Respiratory: Negative. Cardiovascular: Negative. Gastrointestinal: Negative. Musculoskeletal: Negative. All other systems reviewed and are negative.     Prior to Visit Medications    Not on File        No Known Allergies    Past Medical History:   Diagnosis Date    Cancer ([facility] [address] 2018    skin    Hyperlipidemia        Past Surgical History:   Procedure Laterality Date    BREAST ENHANCEMENT SURGERY Bilateral     BREAST ENHANCEMENT SURGERY Bilateral 2020    BILATERAL IMPLANT EXCHANGE WITH CAPSULECTOMIES performed by Juliet Gomez MD at [address] [place] [address]         Social History     Socioeconomic History    Marital status:      Spouse name: Tila Robledo    Number of children: 2    Years of education: 12    Highest education level: High school graduate   Occupational History    Not on file   Tobacco Use    Smoking status: Never    Smokeless tobacco: Never   Vaping Use    Vaping Use: Never used   Substance and Sexual Activity    Alcohol use: Yes     Comment: OCCASIONAL    Drug use: Never    Sexual activity: Not on file   Other Topics Concern    Not on file   Social History Narrative    Not on file     Social Determinants of Health     Financial Resource Strain: Low Risk     Difficulty of Paying Living Expenses: Not hard at all   Food Insecurity: No Food Insecurity    Worried About Running Out of Food in the Last Year: Never true    Ran Out of Food in the Last Year: Never true   Transportation Needs: Not on file   Physical Activity: Insufficiently Active    Days of Exercise per Week: 3 days    Minutes of Exercise per Session: 20 min   Stress: Not on file   Social Connections: Not on file   Intimate Partner Violence: Not on file   Housing Stability: Not on file        Family History   Problem Relation Age of Onset    Cancer Mother         colon    Cancer Father         lung, colon, and prostate    Diabetes Father     COPD Father        ADVANCE DIRECTIVE: N, <no information>    Vitals:    09/01/22 1104   BP: 128/78   Pulse: (!) 105   Resp: 15   Weight: 146 lb 9.6 oz (66.5 kg)   Height: 5' 4\" (1.626 m)     Estimated body mass index is 25.16 kg/m² as calculated from the following:    Height as of this encounter: 5' 4\" (1.626 m). Weight as of this encounter: 146 lb 9.6 oz (66.5 kg). Physical Exam  Vitals and nursing note reviewed. Constitutional:       General: She is not in acute distress. Appearance: Normal appearance. She is well-developed. HENT:      Head: Normocephalic and atraumatic. Right Ear: Tympanic membrane normal.      Left Ear: Tympanic membrane normal.   Eyes:      Conjunctiva/sclera: Conjunctivae normal.   Cardiovascular:      Rate and Rhythm: Normal rate and regular rhythm. Heart sounds: Normal heart sounds. No murmur heard. Pulmonary:      Effort: Pulmonary effort is normal.      Breath sounds: Normal breath sounds. No wheezing, rhonchi or rales. Abdominal:      General: There is no distension. Musculoskeletal:      Cervical back: Neck supple. Skin:     General: Skin is warm and dry. Findings: No rash (on exposed surfaces). Neurological:      General: No focal deficit present. Mental Status: She is alert.    Psychiatric:         Attention and Perception: Attention normal.         Mood and Affect: Mood normal.         Speech: Speech normal.         Behavior: Behavior normal. Behavior is cooperative. Thought Content: Thought content normal.         Judgment: Judgment normal.       No flowsheet data found.     Lab Results   Component Value Date/Time    CHOL 209 04/09/2022 06:30 AM    CHOL 151 10/02/2021 06:30 AM    CHOL 176 04/06/2019 06:30 AM    CHOL 155 09/25/2014 08:28 AM    CHOL 262 05/08/2014 12:00 AM    TRIG 84 04/09/2022 06:30 AM    TRIG 50 10/02/2021 06:30 AM    TRIG 77 04/06/2019 06:30 AM    HDL 89 04/09/2022 06:30 AM    HDL 86 10/02/2021 06:30 AM    HDL 92 04/06/2019 06:30 AM    LDLCALC 103 04/09/2022 06:30 AM    LDLCALC 55 10/02/2021 06:30 AM    LDLCALC 69 04/06/2019 06:30 AM    GLUCOSE 101 04/09/2022 06:30 AM    LABA1C 5.9 05/06/2014 12:00 AM       The 10-year ASCVD risk score (Nathalia Cordoba et al., 2013) is: 6%    Values used to calculate the score:      Age: 78 years      Sex: Female      Is Non- : No      Diabetic: No      Tobacco smoker: No      Systolic Blood Pressure: 471 mmHg      Is BP treated: No      HDL Cholesterol: 89 mg/dL      Total Cholesterol: 209 mg/dL    Immunization History   Administered Date(s) Administered    COVID-19, J&J, (age 18y+), IM, 0.5 mL 03/31/2021    COVID-19, [place] Vaccine, Vaccine Unspecified 12/13/2021    Pneumococcal Polysaccharide ([id number]) 06/16/2021    Tdap (Boostrix, Adacel) 09/30/2015    Zoster Live (Zostavax) 09/30/2015       Health Maintenance   Topic Date Due    Hepatitis C screen  Never done    DEXA (modify frequency per FRAX score)  Never done    Shingles vaccine (2 of 3) 11/25/2015    A1C test (Diabetic or Prediabetic)  05/14/2016    Breast cancer screen  01/14/2022    COVID-19 Vaccine (3 - Booster for Autumn series) 04/13/2022    Pneumococcal 65+ years Vaccine (2 - PCV) 06/16/2022    Flu vaccine (1) Never done    Depression Screen  08/31/2023    Annual Wellness Visit (AWV)  09/02/2023    DTaP/Tdap/Td vaccine (2 - Td or Tdap) 09/30/2025    Lipids  04/09/2027    Colorectal Cancer Screen  05/24/2028    Hepatitis A vaccine  Aged Out    Hepatitis B vaccine  Aged Out    Hib vaccine  Aged Out    Meningococcal (ACWY) vaccine  Aged Out       Assessment & Plan   Medicare annual wellness visit, subsequent  Post-menopausal  Hyperlipidemia with target LDL less than 100  Encounter for screening mammogram for malignant neoplasm of breast  -     PRATIMA DIGITAL SCREEN W OR WO CAD BILATERAL; Future    -  Chronic medical problems stable  -  Ok to dc Lipitor at this time based on 10-yr risk  -  Order for mammogram given  -  Declines DEXA, Prevnar-20 at this time    Return for Patrick Visit in 1 year. --Jaden Carry, DO      Medicare Annual Wellness Visit    Jess Austin is here for Medicare AWV    Assessment & Plan   Medicare annual wellness visit, subsequent  Post-menopausal  Hyperlipidemia with target LDL less than 100  Encounter for screening mammogram for malignant neoplasm of breast  -     PRATIMA DIGITAL SCREEN W OR WO CAD BILATERAL; Future    Recommendations for Preventive Services Due: see orders and patient instructions/AVS.  Recommended screening schedule for the next 5-10 years is provided to the patient in written form: see Patient Instructions/AVS.     Return for Medicare Annual Wellness Visit in 1 year. Subjective       Patient's complete Health Risk Assessment and screening values have been reviewed and are found in Flowsheets. The following problems were reviewed today and where indicated follow up appointments were made and/or referrals ordered. Positive Risk Factor Screenings with Interventions:        Alcohol Screening:  Alcohol Use: Heavy Drinker    Frequency of Alcohol Consumption: 2-4 times a month    Average Number of Drinks: 3 or 4    Frequency of Binge Drinking: Less than monthly     AUDIT-C Score: 4  AUDIT Total Score: 4  An AUDIT-C score of 3-7 indicates potential alcohol risk.    An AUDIT Total score of 8 or more is associated with harmful or hazardous drinking. A score of 13 or more in women, and 15 or more in men, is likely to indicate alcohol dependence. Substance Use - Alcohol Interventions:  Educational materials provided        General Health and ACP:  General  In general, how would you say your health is?: Very Good  In the past 7 days, have you experienced any of the following: New or Increased Pain, New or Increased Fatigue, Loneliness, Social Isolation, Stress or Anger?: No  Do you get the social and emotional support that you need?: Yes  Do you have a Living Will?: Yes    Advance Directives       Power of  Living Will ACP-Advance Directive ACP-Power of     Not on File Not on File Not on File Not on File          General Health Risk Interventions:  NA              Objective   Vitals:    09/01/22 1104   BP: 128/78   Pulse: (!) 105   Resp: 15   Weight: 146 lb 9.6 oz (66.5 kg)   Height: 5' 4\" (1.626 m)      Body mass index is 25.16 kg/m².            No Known Allergies  Prior to Visit Medications    Not on File       CareTeam (Including outside providers/suppliers regularly involved in providing care):   Patient Care Team:  Pankaj Trivedi DO as PCP - General (Family Medicine)  Pankaj Trivedi DO as PCP - [facility] Empaneled Provider  Justin Fernandez MD (Dermatology)     Reviewed and updated this visit:  Tobacco  Allergies  Meds  Med Hx  Surg Hx  Soc Hx  Fam Hx

## 2022-10-30 DIAGNOSIS — E78.5 HYPERLIPIDEMIA WITH TARGET LDL LESS THAN 100: ICD-10-CM

## 2023-10-18 ENCOUNTER — OFFICE VISIT (OUTPATIENT)
Dept: FAMILY MEDICINE CLINIC | Age: 68
End: 2023-10-18
Payer: MEDICARE

## 2023-10-18 VITALS
DIASTOLIC BLOOD PRESSURE: 64 MMHG | RESPIRATION RATE: 16 BRPM | SYSTOLIC BLOOD PRESSURE: 118 MMHG | HEART RATE: 80 BPM | BODY MASS INDEX: 25.3 KG/M2 | HEIGHT: 64 IN | WEIGHT: 148.2 LBS

## 2023-10-18 DIAGNOSIS — K21.9 GASTROESOPHAGEAL REFLUX DISEASE, UNSPECIFIED WHETHER ESOPHAGITIS PRESENT: ICD-10-CM

## 2023-10-18 DIAGNOSIS — Z78.0 POST-MENOPAUSAL: ICD-10-CM

## 2023-10-18 DIAGNOSIS — K44.9 HIATAL HERNIA: ICD-10-CM

## 2023-10-18 DIAGNOSIS — E78.5 HYPERLIPIDEMIA WITH TARGET LDL LESS THAN 100: ICD-10-CM

## 2023-10-18 DIAGNOSIS — Z00.00 MEDICARE ANNUAL WELLNESS VISIT, SUBSEQUENT: Primary | ICD-10-CM

## 2023-10-18 PROCEDURE — G8419 CALC BMI OUT NRM PARAM NOF/U: HCPCS | Performed by: FAMILY MEDICINE

## 2023-10-18 PROCEDURE — 1036F TOBACCO NON-USER: CPT | Performed by: FAMILY MEDICINE

## 2023-10-18 PROCEDURE — 1123F ACP DISCUSS/DSCN MKR DOCD: CPT | Performed by: FAMILY MEDICINE

## 2023-10-18 PROCEDURE — 1090F PRES/ABSN URINE INCON ASSESS: CPT | Performed by: FAMILY MEDICINE

## 2023-10-18 PROCEDURE — G8400 PT W/DXA NO RESULTS DOC: HCPCS | Performed by: FAMILY MEDICINE

## 2023-10-18 PROCEDURE — G8484 FLU IMMUNIZE NO ADMIN: HCPCS | Performed by: FAMILY MEDICINE

## 2023-10-18 PROCEDURE — G0439 PPPS, SUBSEQ VISIT: HCPCS | Performed by: FAMILY MEDICINE

## 2023-10-18 PROCEDURE — 99213 OFFICE O/P EST LOW 20 MIN: CPT | Performed by: FAMILY MEDICINE

## 2023-10-18 PROCEDURE — G8427 DOCREV CUR MEDS BY ELIG CLIN: HCPCS | Performed by: FAMILY MEDICINE

## 2023-10-18 PROCEDURE — 3017F COLORECTAL CA SCREEN DOC REV: CPT | Performed by: FAMILY MEDICINE

## 2023-10-18 RX ORDER — PANTOPRAZOLE SODIUM 40 MG/1
40 TABLET, DELAYED RELEASE ORAL DAILY
COMMUNITY
Start: 2023-09-08

## 2023-10-18 SDOH — ECONOMIC STABILITY: FOOD INSECURITY: WITHIN THE PAST 12 MONTHS, THE FOOD YOU BOUGHT JUST DIDN'T LAST AND YOU DIDN'T HAVE MONEY TO GET MORE.: NEVER TRUE

## 2023-10-18 SDOH — ECONOMIC STABILITY: FOOD INSECURITY: WITHIN THE PAST 12 MONTHS, YOU WORRIED THAT YOUR FOOD WOULD RUN OUT BEFORE YOU GOT MONEY TO BUY MORE.: NEVER TRUE

## 2023-10-18 SDOH — ECONOMIC STABILITY: HOUSING INSECURITY
IN THE LAST 12 MONTHS, WAS THERE A TIME WHEN YOU DID NOT HAVE A STEADY PLACE TO SLEEP OR SLEPT IN A SHELTER (INCLUDING NOW)?: NO

## 2023-10-18 SDOH — ECONOMIC STABILITY: INCOME INSECURITY: HOW HARD IS IT FOR YOU TO PAY FOR THE VERY BASICS LIKE FOOD, HOUSING, MEDICAL CARE, AND HEATING?: NOT HARD AT ALL

## 2023-10-18 ASSESSMENT — PATIENT HEALTH QUESTIONNAIRE - PHQ9
SUM OF ALL RESPONSES TO PHQ9 QUESTIONS 1 & 2: 0
SUM OF ALL RESPONSES TO PHQ QUESTIONS 1-9: 0
SUM OF ALL RESPONSES TO PHQ QUESTIONS 1-9: 0
2. FEELING DOWN, DEPRESSED OR HOPELESS: 0
1. LITTLE INTEREST OR PLEASURE IN DOING THINGS: 0
SUM OF ALL RESPONSES TO PHQ QUESTIONS 1-9: 0
SUM OF ALL RESPONSES TO PHQ QUESTIONS 1-9: 0

## 2023-10-18 ASSESSMENT — LIFESTYLE VARIABLES
HOW MANY STANDARD DRINKS CONTAINING ALCOHOL DO YOU HAVE ON A TYPICAL DAY: 3 OR 4
HOW OFTEN DO YOU HAVE A DRINK CONTAINING ALCOHOL: 2-4 TIMES A MONTH

## 2023-10-18 ASSESSMENT — ENCOUNTER SYMPTOMS
RESPIRATORY NEGATIVE: 1
GASTROINTESTINAL NEGATIVE: 1

## 2023-10-18 NOTE — PROGRESS NOTES
10/18/2023    Maame Aguilar (:  1955) is a 75 y.o. female, here for a preventive medicine evaluation. Chief Complaint   Patient presents with    Medicare AWV     Due for mammogram and pneumonia vaccine      AWV. Doing well overall. No acute concerns today. Since last visit, had and EGD and colonoscopy. Dx'd with hiatal hernia and GERD, now on Protonix. BP Readings from Last 3 Encounters:   10/18/23 118/64   22 128/78   21 120/68     Wt Readings from Last 3 Encounters:   10/18/23 67.2 kg (148 lb 3.2 oz)   22 66.5 kg (146 lb 9.6 oz)   21 63.6 kg (140 lb 3.2 oz)     The 10-year ASCVD risk score (Johny WHITE, et al., 2019) is: 6.3%    Values used to calculate the score:      Age: 75 years      Sex: Female      Is Non- : No      Diabetic: No      Tobacco smoker: No      Systolic Blood Pressure: 360 mmHg      Is BP treated: No      HDL Cholesterol: 81 mg/dL      Total Cholesterol: 241 mg/dL      Patient Active Problem List   Diagnosis    Hyperlipidemia with target LDL less than 100    Hiatal hernia    Gastroesophageal reflux disease    Post-menopausal       Review of Systems   Constitutional: Negative. HENT: Negative. Respiratory: Negative. Cardiovascular: Negative. Gastrointestinal: Negative. GERD   Musculoskeletal: Negative. All other systems reviewed and are negative. Prior to Visit Medications    Medication Sig Taking?  Authorizing Provider   pantoprazole (PROTONIX) 40 MG tablet Take 1 tablet by mouth daily  ProviderRosa MD        No Known Allergies    Past Medical History:   Diagnosis Date    Cancer ([address]) 2018    skin    Hyperlipidemia        Past Surgical History:   Procedure Laterality Date    BREAST ENHANCEMENT SURGERY Bilateral     BREAST ENHANCEMENT SURGERY Bilateral 2020    BILATERAL IMPLANT EXCHANGE WITH CAPSULECTOMIES performed by Helen Cook MD at [address]

## 2024-10-02 ENCOUNTER — COMMUNITY OUTREACH (OUTPATIENT)
Dept: FAMILY MEDICINE CLINIC | Age: 69
End: 2024-10-02

## 2024-11-11 SDOH — ECONOMIC STABILITY: FOOD INSECURITY: WITHIN THE PAST 12 MONTHS, YOU WORRIED THAT YOUR FOOD WOULD RUN OUT BEFORE YOU GOT MONEY TO BUY MORE.: NEVER TRUE

## 2024-11-11 SDOH — HEALTH STABILITY: PHYSICAL HEALTH: ON AVERAGE, HOW MANY MINUTES DO YOU ENGAGE IN EXERCISE AT THIS LEVEL?: 20 MIN

## 2024-11-11 SDOH — ECONOMIC STABILITY: FOOD INSECURITY: WITHIN THE PAST 12 MONTHS, THE FOOD YOU BOUGHT JUST DIDN'T LAST AND YOU DIDN'T HAVE MONEY TO GET MORE.: NEVER TRUE

## 2024-11-11 SDOH — ECONOMIC STABILITY: INCOME INSECURITY: HOW HARD IS IT FOR YOU TO PAY FOR THE VERY BASICS LIKE FOOD, HOUSING, MEDICAL CARE, AND HEATING?: NOT HARD AT ALL

## 2024-11-11 SDOH — HEALTH STABILITY: PHYSICAL HEALTH: ON AVERAGE, HOW MANY DAYS PER WEEK DO YOU ENGAGE IN MODERATE TO STRENUOUS EXERCISE (LIKE A BRISK WALK)?: 5 DAYS

## 2024-11-11 ASSESSMENT — LIFESTYLE VARIABLES
HAVE YOU OR SOMEONE ELSE BEEN INJURED AS A RESULT OF YOUR DRINKING: NO
HAS A RELATIVE, FRIEND, DOCTOR, OR ANOTHER HEALTH PROFESSIONAL EXPRESSED CONCERN ABOUT YOUR DRINKING OR SUGGESTED YOU CUT DOWN: NO
HOW MANY STANDARD DRINKS CONTAINING ALCOHOL DO YOU HAVE ON A TYPICAL DAY: 2
HOW OFTEN DO YOU HAVE A DRINK CONTAINING ALCOHOL: 2-4 TIMES A MONTH
HOW OFTEN DURING THE LAST YEAR HAVE YOU FAILED TO DO WHAT WAS NORMALLY EXPECTED FROM YOU BECAUSE OF DRINKING: NEVER
HOW OFTEN DURING THE LAST YEAR HAVE YOU BEEN UNABLE TO REMEMBER WHAT HAPPENED THE NIGHT BEFORE BECAUSE YOU HAD BEEN DRINKING: NEVER
HOW OFTEN DURING THE LAST YEAR HAVE YOU FAILED TO DO WHAT WAS NORMALLY EXPECTED FROM YOU BECAUSE OF DRINKING: NEVER
HOW OFTEN DURING THE LAST YEAR HAVE YOU HAD A FEELING OF GUILT OR REMORSE AFTER DRINKING: NEVER
HOW OFTEN DURING THE LAST YEAR HAVE YOU FOUND THAT YOU WERE NOT ABLE TO STOP DRINKING ONCE YOU HAD STARTED: NEVER
HAS A RELATIVE, FRIEND, DOCTOR, OR ANOTHER HEALTH PROFESSIONAL EXPRESSED CONCERN ABOUT YOUR DRINKING OR SUGGESTED YOU CUT DOWN: NO
HOW OFTEN DO YOU HAVE SIX OR MORE DRINKS ON ONE OCCASION: 1
HOW OFTEN DURING THE LAST YEAR HAVE YOU HAD A FEELING OF GUILT OR REMORSE AFTER DRINKING: NEVER
HOW OFTEN DURING THE LAST YEAR HAVE YOU NEEDED AN ALCOHOLIC DRINK FIRST THING IN THE MORNING TO GET YOURSELF GOING AFTER A NIGHT OF HEAVY DRINKING: NEVER
HOW OFTEN DURING THE LAST YEAR HAVE YOU FOUND THAT YOU WERE NOT ABLE TO STOP DRINKING ONCE YOU HAD STARTED: NEVER
HOW MANY STANDARD DRINKS CONTAINING ALCOHOL DO YOU HAVE ON A TYPICAL DAY: 3 OR 4
HOW OFTEN DURING THE LAST YEAR HAVE YOU NEEDED AN ALCOHOLIC DRINK FIRST THING IN THE MORNING TO GET YOURSELF GOING AFTER A NIGHT OF HEAVY DRINKING: NEVER
HOW OFTEN DURING THE LAST YEAR HAVE YOU BEEN UNABLE TO REMEMBER WHAT HAPPENED THE NIGHT BEFORE BECAUSE YOU HAD BEEN DRINKING: NEVER
HOW OFTEN DO YOU HAVE A DRINK CONTAINING ALCOHOL: 3
HAVE YOU OR SOMEONE ELSE BEEN INJURED AS A RESULT OF YOUR DRINKING: NO

## 2024-11-11 ASSESSMENT — PATIENT HEALTH QUESTIONNAIRE - PHQ9
2. FEELING DOWN, DEPRESSED OR HOPELESS: NOT AT ALL
SUM OF ALL RESPONSES TO PHQ QUESTIONS 1-9: 0
1. LITTLE INTEREST OR PLEASURE IN DOING THINGS: NOT AT ALL
SUM OF ALL RESPONSES TO PHQ QUESTIONS 1-9: 0
SUM OF ALL RESPONSES TO PHQ9 QUESTIONS 1 & 2: 0

## 2024-11-13 ENCOUNTER — OFFICE VISIT (OUTPATIENT)
Dept: FAMILY MEDICINE CLINIC | Age: 69
End: 2024-11-13

## 2024-11-13 VITALS
HEIGHT: 64 IN | RESPIRATION RATE: 16 BRPM | HEART RATE: 80 BPM | WEIGHT: 151.4 LBS | SYSTOLIC BLOOD PRESSURE: 132 MMHG | BODY MASS INDEX: 25.85 KG/M2 | DIASTOLIC BLOOD PRESSURE: 74 MMHG

## 2024-11-13 DIAGNOSIS — Z23 NEED FOR PNEUMOCOCCAL 20-VALENT CONJUGATE VACCINATION: ICD-10-CM

## 2024-11-13 DIAGNOSIS — Z78.0 POST-MENOPAUSAL: ICD-10-CM

## 2024-11-13 DIAGNOSIS — K21.9 GASTROESOPHAGEAL REFLUX DISEASE, UNSPECIFIED WHETHER ESOPHAGITIS PRESENT: ICD-10-CM

## 2024-11-13 DIAGNOSIS — E78.5 HYPERLIPIDEMIA WITH TARGET LDL LESS THAN 100: ICD-10-CM

## 2024-11-13 DIAGNOSIS — K44.9 HIATAL HERNIA: ICD-10-CM

## 2024-11-13 DIAGNOSIS — Z00.00 MEDICARE ANNUAL WELLNESS VISIT, SUBSEQUENT: Primary | ICD-10-CM

## 2024-11-13 ASSESSMENT — ENCOUNTER SYMPTOMS
GASTROINTESTINAL NEGATIVE: 1
RESPIRATORY NEGATIVE: 1

## 2024-11-13 NOTE — PROGRESS NOTES
Capri Esteban (:  1955) is a 68 y.o. female,Established patient, here for evaluation of the following chief complaint(s):  Medicare AWV          Subjective   SUBJECTIVE/OBJECTIVE:  HPI  Chief Complaint   Patient presents with    Medicare AWV     AWV.      Doing well overall.    BP Readings from Last 3 Encounters:   24 132/74   10/18/23 118/64   22 128/78     Wt Readings from Last 3 Encounters:   24 68.7 kg (151 lb 6.4 oz)   10/18/23 67.2 kg (148 lb 3.2 oz)   22 66.5 kg (146 lb 9.6 oz)       Patient Active Problem List   Diagnosis    Hyperlipidemia with target LDL less than 100    Hiatal hernia    Gastroesophageal reflux disease    Post-menopausal       Current Outpatient Medications   Medication Sig Dispense Refill    pantoprazole (PROTONIX) 40 MG tablet Take 1 tablet by mouth daily       No current facility-administered medications for this visit.       Past Surgical History:   Procedure Laterality Date    BREAST ENHANCEMENT SURGERY Bilateral     BREAST ENHANCEMENT SURGERY Bilateral 2020    BILATERAL IMPLANT EXCHANGE WITH CAPSULECTOMIES performed by Kenny Cuevas MD at [facility] OR     SECTION      COLONOSCOPY         Review of Systems   Constitutional: Negative.    HENT: Negative.     Respiratory: Negative.     Cardiovascular: Negative.    Gastrointestinal: Negative.    Musculoskeletal: Negative.    All other systems reviewed and are negative.         Objective   Physical Exam  Vitals and nursing note reviewed.   Constitutional:       General: She is not in acute distress.     Appearance: Normal appearance. She is well-developed.   HENT:      Head: Normocephalic and atraumatic.      Right Ear: Tympanic membrane normal.      Left Ear: Tympanic membrane normal.   Eyes:      Conjunctiva/sclera: Conjunctivae normal.   Cardiovascular:      Rate and Rhythm: Normal rate and regular rhythm.      Heart sounds: Normal heart sounds. No murmur

## 2024-11-13 NOTE — PATIENT INSTRUCTIONS
Eat a variety of fruit and vegetables every day. Dark green, deep orange, red, or yellow fruits and vegetables are especially good for you. Examples include spinach, carrots, peaches, and berries.     Foods high in fiber can reduce your cholesterol and provide important vitamins and minerals. High-fiber foods include whole-grain cereals and breads, oatmeal, beans, brown rice, citrus fruits, and apples.     Eat lean proteins. Heart-healthy proteins include seafood, lean meats and poultry, eggs, beans, peas, nuts, seeds, and soy products.     Limit drinks and foods with added sugar. These include candy, desserts, and soda pop.   Heart-healthy lifestyle    If your doctor recommends it, get more exercise. For many people, walking is a good choice. Or you may want to swim, bike, or do other activities. Bit by bit, increase the time you're active every day. Try for at least 30 minutes on most days of the week.     Try to quit or cut back on using tobacco and other nicotine products. This includes smoking and vaping. If you need help quitting, talk to your doctor about stop-smoking programs and medicines. These can increase your chances of quitting for good. Quitting is one of the most important things you can do to protect your heart. It is never too late to quit. Try to avoid secondhand smoke too.     Stay at a weight that's healthy for you. Talk to your doctor if you need help losing weight.     Try to get 7 to 9 hours of sleep each night.     Limit alcohol to 2 drinks a day for men and 1 drink a day for women. Too much alcohol can cause health problems.     Manage other health problems such as diabetes, high blood pressure, and high cholesterol. If you think you may have a problem with alcohol or drug use, talk to your doctor.   Medicines    Take your medicines exactly as prescribed. Call your doctor if you think you are having a problem with your medicine.     If your doctor recommends aspirin, take the amount

## 2024-11-13 NOTE — PROGRESS NOTES
Immunizations Administered       Name Date Dose Route    Pneumococcal, PCV20, PREVNAR 20, (age 6w+), IM, 0.5mL 11/13/2024 0.5 mL Intramuscular    Site: Deltoid- Left    Lot: [id number]    NDC: 0005-2000-01            Patient was given Prevnar 20 0.5ml IM in left deltoid per v.o. Dr Pedraza.  NDC# 0005-2000-01.  Patient tolerated well and without incident.  VIS given and reviewed.  ABN signed.

## 2025-02-11 ENCOUNTER — TRANSCRIBE ORDERS (OUTPATIENT)
Dept: ADMINISTRATIVE | Age: 70
End: 2025-02-11

## 2025-02-11 DIAGNOSIS — N63.42 UNSPECIFIED LUMP IN LEFT BREAST, SUBAREOLAR: Primary | ICD-10-CM

## 2025-02-13 ENCOUNTER — HOSPITAL ENCOUNTER (OUTPATIENT)
Dept: WOMENS IMAGING | Age: 70
Discharge: HOME | End: 2025-02-13
Payer: MEDICARE

## 2025-02-13 VITALS — WEIGHT: 150 LBS | HEIGHT: 64 IN | BODY MASS INDEX: 25.61 KG/M2

## 2025-02-13 DIAGNOSIS — N63.42 UNSPECIFIED LUMP IN LEFT BREAST, SUBAREOLAR: ICD-10-CM

## 2025-02-13 DIAGNOSIS — N63.11 MASS OF UPPER OUTER QUADRANT OF RIGHT BREAST: ICD-10-CM

## 2025-02-13 PROCEDURE — 76642 ULTRASOUND BREAST LIMITED: CPT

## 2025-02-13 PROCEDURE — G0279 TOMOSYNTHESIS, MAMMO: HCPCS

## 2025-02-13 PROCEDURE — 76641 ULTRASOUND BREAST COMPLETE: CPT

## 2025-02-17 ENCOUNTER — HOSPITAL ENCOUNTER (OUTPATIENT)
Dept: WOMENS IMAGING | Age: 70
Discharge: HOME | End: 2025-02-17
Payer: MEDICARE

## 2025-02-17 ENCOUNTER — HOSPITAL ENCOUNTER (OUTPATIENT)
Dept: WOMENS IMAGING | Age: 70
Discharge: HOME | End: 2025-02-17
Attending: RADIOLOGY
Payer: MEDICARE

## 2025-02-17 DIAGNOSIS — N63.22 MASS OF UPPER INNER QUADRANT OF LEFT BREAST: ICD-10-CM

## 2025-02-17 PROCEDURE — G0279 TOMOSYNTHESIS, MAMMO: HCPCS

## 2025-02-17 PROCEDURE — 88342 IMHCHEM/IMCYTCHM 1ST ANTB: CPT

## 2025-02-17 PROCEDURE — 88341 IMHCHEM/IMCYTCHM EA ADD ANTB: CPT

## 2025-02-17 PROCEDURE — C1894 INTRO/SHEATH, NON-LASER: HCPCS

## 2025-02-17 PROCEDURE — 88305 TISSUE EXAM BY PATHOLOGIST: CPT

## 2025-02-17 NOTE — PROGRESS NOTES
Breast Biopsy Flowsheet/Post-Operative Care    Date of Procedure: 2/17/2025  Physician: Dr. Alvarado  Technologist: Ngozi Alvarado RT(R)(M)    Biopsy:ultrasound guided breast biopsy X 2  Lesion type: Palpable  Breast: left    Clock face position: Site #1: 10+3 barbell     Site #2: 10+1 U          Primary Method of Detection: Palpation      Microcalcification's: no   Distribution: N/A        Biopsy Method:   Sertera:    Site # 1    Gauge: 14    # of Passes: 3     Clip: Barbell    Sertera:    Site # 2    Gauge: 14    # of Passes: 4     Clip:  U          Pre-Op Assessment: (BI-RADS)   4. Suspicious Abnormality    Patient Tolerated Procedure: good  Complications: n/a  Comments: n/a    Post Operative Care  Steri strips: Yes  Dressing: Gauze, Tape   Ice Applied to Site:  No  Evidence of Bleeding:  No    Pain Verbalized: No      Written Discharge Instructions: Yes  Condition at Discharge: good  Time of Discharge: 1742    Electronically signed by Alena David on 2/17/2025 at 2:09 PM

## 2025-02-17 NOTE — PROGRESS NOTES
[facility]  Pre-Biopsy Assessment      Patient Education    Written information about procedure Yes  left   Procedural steps explained Yes Ultrasound Biopsy   Post-op potential: bruising, hematoma, pain Yes    Self-care: activity, care of dressing Yes    Patient verbalized understanding Yes    Consent signed and witnessed Yes      Hormone Therapy Status: n/a    Recent Medication: N/A Last Dose: n/a                                     Hormone Replacement Therapy: no    Previous Breast Biopsy: no    Previous Diagnosis Cancer: no    Hysterectomy:no    Emotional Status: Calm    Language or Physical Barriers:  n/a  Comments: n/a      Electronically signed by Alena David on 2/17/2025 at 12:52 PM

## 2025-02-18 ENCOUNTER — CLINICAL DOCUMENTATION (OUTPATIENT)
Dept: WOMENS IMAGING | Age: 70
End: 2025-02-18

## 2025-02-18 DIAGNOSIS — Z09 FOLLOW-UP EXAM: Primary | ICD-10-CM

## 2025-02-18 DIAGNOSIS — N63.22 MASS OF UPPER INNER QUADRANT OF LEFT BREAST: ICD-10-CM

## 2025-02-18 NOTE — PROGRESS NOTES
Contact Type:  [facility]    Contact Information: Arrived with her  for biopsy results. After reviewing with Dr. Alvarado, I discussed pathology and recommendation for excisional biopsy. All cards given for surgeons.    Diagnosis: papillary lesion - complete excision recommended    Patient Expresses Need(s) For: not sure which surgeon to pick, is going to reach out to Dr. Pedraza and call us back for a referral    Requests Referral to Doctor(s) with appointment(s): undecided, see above notes     Biopsy site status: doing well    Updated Tyrer-Cuzick Score, if applicable: n/a    Teaching sheets provided: Excisional biopsy, Needle localization    Notes: Explained terms doctor and navigators will discuss at next appointments. Questions addressed.       Results faxed to:  Dr. Pedraza

## 2025-02-24 ENCOUNTER — OFFICE VISIT (OUTPATIENT)
Dept: SURGERY | Age: 70
End: 2025-02-24
Payer: MEDICARE

## 2025-02-24 VITALS
HEIGHT: 64 IN | TEMPERATURE: 98 F | OXYGEN SATURATION: 98 % | WEIGHT: 152.1 LBS | DIASTOLIC BLOOD PRESSURE: 86 MMHG | BODY MASS INDEX: 25.97 KG/M2 | SYSTOLIC BLOOD PRESSURE: 138 MMHG | HEART RATE: 97 BPM

## 2025-02-24 DIAGNOSIS — N64.9 BREAST LESION: ICD-10-CM

## 2025-02-24 DIAGNOSIS — K21.9 GASTROESOPHAGEAL REFLUX DISEASE, UNSPECIFIED WHETHER ESOPHAGITIS PRESENT: ICD-10-CM

## 2025-02-24 DIAGNOSIS — R92.8 ABNORMAL FINDING ON BREAST IMAGING: ICD-10-CM

## 2025-02-24 DIAGNOSIS — R92.8 ABNORMALITY OF LEFT BREAST ON SCREENING MAMMOGRAM: Primary | ICD-10-CM

## 2025-02-24 DIAGNOSIS — R92.8 ABNORMAL ULTRASOUND OF BREAST: ICD-10-CM

## 2025-02-24 PROCEDURE — G8427 DOCREV CUR MEDS BY ELIG CLIN: HCPCS | Performed by: SURGERY

## 2025-02-24 PROCEDURE — 3017F COLORECTAL CA SCREEN DOC REV: CPT | Performed by: SURGERY

## 2025-02-24 PROCEDURE — 1123F ACP DISCUSS/DSCN MKR DOCD: CPT | Performed by: SURGERY

## 2025-02-24 PROCEDURE — 99203 OFFICE O/P NEW LOW 30 MIN: CPT | Performed by: SURGERY

## 2025-02-24 PROCEDURE — G8400 PT W/DXA NO RESULTS DOC: HCPCS | Performed by: SURGERY

## 2025-02-24 PROCEDURE — 1090F PRES/ABSN URINE INCON ASSESS: CPT | Performed by: SURGERY

## 2025-02-24 PROCEDURE — 1036F TOBACCO NON-USER: CPT | Performed by: SURGERY

## 2025-02-24 PROCEDURE — G8419 CALC BMI OUT NRM PARAM NOF/U: HCPCS | Performed by: SURGERY

## 2025-02-24 PROCEDURE — 1159F MED LIST DOCD IN RCRD: CPT | Performed by: SURGERY

## 2025-02-24 ASSESSMENT — ENCOUNTER SYMPTOMS
WHEEZING: 0
VOICE CHANGE: 0
FACIAL SWELLING: 0
SHORTNESS OF BREATH: 0
SORE THROAT: 0
EYE REDNESS: 0
ABDOMINAL PAIN: 0
TROUBLE SWALLOWING: 0
VOMITING: 0
ABDOMINAL DISTENTION: 0
PHOTOPHOBIA: 0
APNEA: 0
CHEST TIGHTNESS: 0
DIARRHEA: 0
NAUSEA: 0
STRIDOR: 0
ANAL BLEEDING: 0
RHINORRHEA: 0
CONSTIPATION: 0
EYE DISCHARGE: 0
COUGH: 0
SINUS PAIN: 0
BLOOD IN STOOL: 0
EYE ITCHING: 0
COLOR CHANGE: 0
EYE PAIN: 0
RECTAL PAIN: 0
SINUS PRESSURE: 0
BACK PAIN: 0
CHOKING: 0

## 2025-02-24 NOTE — PROGRESS NOTES
[facility]        Navin Cota MD FACS  General Surgery  New Patient Evaluation in Office  Pt Name: Capri Esteban  Date of Birth 1955   Today's Date: 2/24/2025  Medical Record Number: [id number]  Referring Provider: No ref. provider found  Primary Care Provider: Navin Pedraza DO  Chief Complaint   Patient presents with    Surgical Consult     NP refer [facility]-Left breast papillar lesion     ASSESSMENT      Problem List Items Addressed This Visit       Gastroesophageal reflux disease    Abnormality of left breast on screening mammogram - Primary    Relevant Orders    Hemoglobin and Hematocrit    US PLACE BREAST LOC DEVICE 1ST LESION LEFT    PRATIMA POST BX CLIP PLACEMENT LEFT    EXCISION BREAST LESION W/ PREOP NEEDLE LOC    Abnormal ultrasound of breast    Relevant Orders    Hemoglobin and Hematocrit    US PLACE BREAST LOC DEVICE 1ST LESION LEFT    PRATIMA POST BX CLIP PLACEMENT LEFT    EXCISION BREAST LESION W/ PREOP NEEDLE LOC    Abnormal finding on breast imaging    Breast lesion     There are no active hospital problems to display for this patient.       PLANS   Assessment & Plan   Schedule Capri for   Left us needle loc  Exc biopsy left breast with nlp  Tiva  op  Status: outpatient  Planned anesthesia: TIVA  She will undergo pre-operative clearance per anesthesia guidelines with risk factors listed under the past medical history diagnosis & problem list.  Perioperative discontinuation of ASA, Plavix, warfarin, Brillinta, Effient, Pradaxa, Eliquis, and Xarelto. Continuation of 81 mg Aspirin is acceptable.  Perioperative medical clearance is not required  Orders Placed This Encounter:  Orders Placed This Encounter   Procedures    EXCISION BREAST LESION W/ PREOP NEEDLE LOC     Standing Status:   Future     Standing Expiration Date:   2/24/2026     Order Specific Question:   Pre-procedure Diagnosis     Answer:   LEFT BREAST MASS    Case Request     Order Specific Question:   Case

## 2025-02-24 NOTE — PROGRESS NOTES
Subjective   Patient ID: Capri Esteban is a 69 y.o. female.  Chief Complaint   Patient presents with    Surgical Consult     NP refer [facility]-Left breast papillar lesion       HPI    Review of Systems   Constitutional:  Negative for activity change, appetite change, chills, diaphoresis, fatigue, fever and unexpected weight change.   HENT:  Negative for congestion, dental problem, drooling, ear discharge, ear pain, facial swelling, hearing loss, mouth sores, nosebleeds, postnasal drip, rhinorrhea, sinus pressure, sinus pain, sneezing, sore throat, tinnitus, trouble swallowing and voice change.    Eyes:  Negative for photophobia, pain, discharge, redness, itching and visual disturbance.   Respiratory:  Negative for apnea, cough, choking, chest tightness, shortness of breath, wheezing and stridor.    Cardiovascular:  Negative for chest pain, palpitations and leg swelling.   Gastrointestinal:  Negative for abdominal distention, abdominal pain, anal bleeding, blood in stool, constipation, diarrhea, nausea, rectal pain and vomiting.   Endocrine: Negative for cold intolerance, heat intolerance, polydipsia, polyphagia and polyuria.   Genitourinary:  Negative for decreased urine volume, difficulty urinating, dyspareunia, dysuria, enuresis, flank pain, frequency, genital sores, hematuria, menstrual problem, pelvic pain, urgency, vaginal bleeding, vaginal discharge and vaginal pain.   Musculoskeletal:  Negative for arthralgias, back pain, gait problem, joint swelling, myalgias, neck pain and neck stiffness.   Skin:  Negative for color change, pallor, rash and wound.   Allergic/Immunologic: Negative for environmental allergies, food allergies and immunocompromised state.   Neurological:  Negative for dizziness, tremors, seizures, syncope, facial asymmetry, speech difficulty, weakness, light-headedness, numbness and headaches.   Hematological:  Negative for adenopathy. Does not bruise/bleed easily.   Psychiatric/Behavioral:

## 2025-03-03 ENCOUNTER — LAB (OUTPATIENT)
Dept: LAB | Age: 70
End: 2025-03-03

## 2025-03-03 DIAGNOSIS — R92.8 ABNORMALITY OF LEFT BREAST ON SCREENING MAMMOGRAM: ICD-10-CM

## 2025-03-03 DIAGNOSIS — R92.8 ABNORMAL ULTRASOUND OF BREAST: ICD-10-CM

## 2025-03-03 LAB
HCT VFR BLD AUTO: 45.2 % (ref 37–47)
HGB BLD-MCNC: 15.2 GM/DL (ref 12–16)

## 2025-03-05 PROBLEM — N64.9 BREAST LESION: Status: RESOLVED | Noted: 2025-02-24 | Resolved: 2025-03-05

## 2025-03-05 NOTE — H&P
difficulty, weakness, light-headedness, numbness and headaches.   Hematological:  Negative for adenopathy. Does not bruise/bleed easily.   Psychiatric/Behavioral:  Negative for agitation, behavioral problems, confusion, decreased concentration, dysphoric mood, hallucinations, self-injury, sleep disturbance and suicidal ideas. The patient is not nervous/anxious and is not hyperactive.    All other systems reviewed and are negative    OBJECTIVE    VITALS:  vitals were not taken for this visit.   CONSTITUTIONAL: Alert and oriented times 3, no acute distress and cooperative to examination with proper mood and affect.  SKIN: Skin color, texture, turgor normal. No rashes or lesions.  LYMPH: no cervical nodes, no inguinal nodes  HEENT: Head is normocephalic, atraumatic. EOMI, PERRLA.  NECK: Supple, symmetrical, trachea midline, no adenopathy, thyroid symmetric, not enlarged and no tenderness, skin normal.   BREAST: Symmetrical with inframammary incisions from breast augmentation.  In the left breast there is some skin changes from Steri-Strips in the right 9 o'clock position medially with some small blood blisters on the skin biopsy site noted patient points out a small palpable firm area and underneath the nipple-areolar complex at about the 9 o'clock position it is unclear to me whether this is biopsy related to the actual lesion that they did biopsy.  No other masses are appreciated no nipple discharge.  No palpable masses in the right breast.    CHEST/LUNGS: chest symmetric with normal A/P diameter, normal respiratory rate and rhythm, lungs clear to auscultation without wheezes, rales or rhonchi. No accessory muscle use. Scars None   CARDIOVASCULAR: Heart sounds are normal.  Regular rate and rhythm without murmur, gallop or rub. Normal S1 and S2.. Carotid and femoral pulses 2+/4 and equal bilaterally.  ABDOMEN: Normal shape.   scar(s) present. Normal bowel sounds.  No bruits. . \"soft, nontender, nondistended,  no masses or organomegaly. no evidence of hernia. Percussion: Normal without hepatosplenomegally. Tenderness: absent.  RECTAL: deferred, not clinically indicated  NEUROLOGIC: There are no focalizing motor or sensory deficits. CN II-XII are grossly intact..   EXTREMITIES: no cyanosis, no clubbing, and no edema.                Electronically signed by Navin Cota MD on 3/5/2025 at 3:45 PM

## 2025-03-06 ENCOUNTER — ANESTHESIA (OUTPATIENT)
Dept: OPERATING ROOM | Age: 70
End: 2025-03-06
Payer: MEDICARE

## 2025-03-06 ENCOUNTER — HOSPITAL ENCOUNTER (OUTPATIENT)
Age: 70
Setting detail: OUTPATIENT SURGERY
Discharge: HOME | End: 2025-03-06
Attending: SURGERY | Admitting: SURGERY
Payer: MEDICARE

## 2025-03-06 ENCOUNTER — HOSPITAL ENCOUNTER (OUTPATIENT)
Dept: WOMENS IMAGING | Age: 70
Discharge: HOME | End: 2025-03-06
Payer: MEDICARE

## 2025-03-06 ENCOUNTER — ANESTHESIA EVENT (OUTPATIENT)
Dept: OPERATING ROOM | Age: 70
End: 2025-03-06
Payer: MEDICARE

## 2025-03-06 VITALS
HEIGHT: 64 IN | SYSTOLIC BLOOD PRESSURE: 133 MMHG | BODY MASS INDEX: 25.13 KG/M2 | HEART RATE: 82 BPM | DIASTOLIC BLOOD PRESSURE: 72 MMHG | WEIGHT: 147.2 LBS | RESPIRATION RATE: 16 BRPM | OXYGEN SATURATION: 96 % | TEMPERATURE: 97.6 F

## 2025-03-06 DIAGNOSIS — N64.9 BREAST LESION: ICD-10-CM

## 2025-03-06 DIAGNOSIS — N63.22 MASS OF UPPER INNER QUADRANT OF LEFT BREAST: ICD-10-CM

## 2025-03-06 DIAGNOSIS — Z98.890 S/P LEFT BREAST BIOPSY: Primary | ICD-10-CM

## 2025-03-06 DIAGNOSIS — R92.8 ABNORMAL ULTRASOUND OF BREAST: ICD-10-CM

## 2025-03-06 DIAGNOSIS — R92.8 ABNORMAL FINDING ON BREAST IMAGING: ICD-10-CM

## 2025-03-06 DIAGNOSIS — R92.8 ABNORMALITY OF LEFT BREAST ON SCREENING MAMMOGRAM: ICD-10-CM

## 2025-03-06 PROCEDURE — 7100000011 HC PHASE II RECOVERY - ADDTL 15 MIN: Performed by: SURGERY

## 2025-03-06 PROCEDURE — 88341 IMHCHEM/IMCYTCHM EA ADD ANTB: CPT

## 2025-03-06 PROCEDURE — 88307 TISSUE EXAM BY PATHOLOGIST: CPT

## 2025-03-06 PROCEDURE — 3600000012 HC SURGERY LEVEL 2 ADDTL 15MIN: Performed by: SURGERY

## 2025-03-06 PROCEDURE — 6360000002 HC RX W HCPCS: Performed by: NURSE ANESTHETIST, CERTIFIED REGISTERED

## 2025-03-06 PROCEDURE — 2500000003 HC RX 250 WO HCPCS: Performed by: SURGERY

## 2025-03-06 PROCEDURE — 6360000002 HC RX W HCPCS: Performed by: SURGERY

## 2025-03-06 PROCEDURE — 2580000003 HC RX 258: Performed by: SURGERY

## 2025-03-06 PROCEDURE — 76098 X-RAY EXAM SURGICAL SPECIMEN: CPT

## 2025-03-06 PROCEDURE — 2709999900 US PLACE BREAST LOC DEVICE 1ST LESION LEFT

## 2025-03-06 PROCEDURE — 3700000001 HC ADD 15 MINUTES (ANESTHESIA): Performed by: SURGERY

## 2025-03-06 PROCEDURE — 77065 DX MAMMO INCL CAD UNI: CPT

## 2025-03-06 PROCEDURE — 3700000000 HC ANESTHESIA ATTENDED CARE: Performed by: SURGERY

## 2025-03-06 PROCEDURE — 7100000010 HC PHASE II RECOVERY - FIRST 15 MIN: Performed by: SURGERY

## 2025-03-06 PROCEDURE — 6370000000 HC RX 637 (ALT 250 FOR IP): Performed by: SURGERY

## 2025-03-06 PROCEDURE — 88342 IMHCHEM/IMCYTCHM 1ST ANTB: CPT

## 2025-03-06 PROCEDURE — 2709999900 HC NON-CHARGEABLE SUPPLY: Performed by: SURGERY

## 2025-03-06 PROCEDURE — 88360 TUMOR IMMUNOHISTOCHEM/MANUAL: CPT

## 2025-03-06 PROCEDURE — 3600000002 HC SURGERY LEVEL 2 BASE: Performed by: SURGERY

## 2025-03-06 RX ORDER — SODIUM CHLORIDE 0.9 % (FLUSH) 0.9 %
5-40 SYRINGE (ML) INJECTION PRN
Status: DISCONTINUED | OUTPATIENT
Start: 2025-03-06 | End: 2025-03-06 | Stop reason: HOSPADM

## 2025-03-06 RX ORDER — LIDOCAINE HCL/PF 100 MG/5ML
SYRINGE (ML) INJECTION
Status: DISCONTINUED | OUTPATIENT
Start: 2025-03-06 | End: 2025-03-06 | Stop reason: SDUPTHER

## 2025-03-06 RX ORDER — ACETAMINOPHEN 500 MG
1000 TABLET ORAL ONCE
Status: COMPLETED | OUTPATIENT
Start: 2025-03-06 | End: 2025-03-06

## 2025-03-06 RX ORDER — SODIUM CHLORIDE 0.9 % (FLUSH) 0.9 %
5-40 SYRINGE (ML) INJECTION EVERY 12 HOURS SCHEDULED
Status: DISCONTINUED | OUTPATIENT
Start: 2025-03-06 | End: 2025-03-06 | Stop reason: HOSPADM

## 2025-03-06 RX ORDER — IBUPROFEN 400 MG/1
400 TABLET, FILM COATED ORAL ONCE
Status: COMPLETED | OUTPATIENT
Start: 2025-03-06 | End: 2025-03-06

## 2025-03-06 RX ORDER — MIDAZOLAM HYDROCHLORIDE 1 MG/ML
INJECTION, SOLUTION INTRAMUSCULAR; INTRAVENOUS
Status: DISCONTINUED | OUTPATIENT
Start: 2025-03-06 | End: 2025-03-06 | Stop reason: SDUPTHER

## 2025-03-06 RX ORDER — PROPOFOL 10 MG/ML
INJECTION, EMULSION INTRAVENOUS
Status: DISCONTINUED | OUTPATIENT
Start: 2025-03-06 | End: 2025-03-06 | Stop reason: SDUPTHER

## 2025-03-06 RX ORDER — SODIUM CHLORIDE 9 MG/ML
INJECTION, SOLUTION INTRAVENOUS PRN
Status: DISCONTINUED | OUTPATIENT
Start: 2025-03-06 | End: 2025-03-06 | Stop reason: HOSPADM

## 2025-03-06 RX ORDER — HYDROCODONE BITARTRATE AND ACETAMINOPHEN 5; 325 MG/1; MG/1
1 TABLET ORAL EVERY 6 HOURS PRN
Qty: 12 TABLET | Refills: 0 | Status: SHIPPED | OUTPATIENT
Start: 2025-03-06 | End: 2025-03-09

## 2025-03-06 RX ORDER — SODIUM CHLORIDE 9 MG/ML
INJECTION, SOLUTION INTRAVENOUS CONTINUOUS
Status: DISCONTINUED | OUTPATIENT
Start: 2025-03-06 | End: 2025-03-06 | Stop reason: HOSPADM

## 2025-03-06 RX ADMIN — SODIUM CHLORIDE: 0.9 INJECTION, SOLUTION INTRAVENOUS at 09:12

## 2025-03-06 RX ADMIN — PROPOFOL 150 MCG/KG/MIN: 10 INJECTION, EMULSION INTRAVENOUS at 09:21

## 2025-03-06 RX ADMIN — WATER 2000 MG: 1 INJECTION INTRAMUSCULAR; INTRAVENOUS; SUBCUTANEOUS at 09:25

## 2025-03-06 RX ADMIN — PROPOFOL 50 MG: 10 INJECTION, EMULSION INTRAVENOUS at 09:20

## 2025-03-06 RX ADMIN — MIDAZOLAM 2 MG: 1 INJECTION INTRAMUSCULAR; INTRAVENOUS at 09:17

## 2025-03-06 RX ADMIN — Medication 50 MG: at 09:20

## 2025-03-06 RX ADMIN — ACETAMINOPHEN 1000 MG: 500 TABLET ORAL at 09:11

## 2025-03-06 RX ADMIN — IBUPROFEN 400 MG: 400 TABLET, FILM COATED ORAL at 09:11

## 2025-03-06 ASSESSMENT — PAIN - FUNCTIONAL ASSESSMENT
PAIN_FUNCTIONAL_ASSESSMENT: 0-10

## 2025-03-06 ASSESSMENT — PAIN SCALES - GENERAL: PAINLEVEL_OUTOF10: 0

## 2025-03-06 NOTE — H&P
H and P for Surgery           [facility]  History and Physical Update    Pt Name: Capri Esteban  MRN: [id number]  YOB: 1955  Date of evaluation: 3/6/2025    [x] I have examined the patient and reviewed the H&P/Consult and there are no changes to the patient or plans.    [] I have examined the patient and reviewed the H&P/Consult and have noted the following changes:        Navin Cota MD  Electronically signed 3/6/2025 at 9:07 AM

## 2025-03-06 NOTE — ANESTHESIA POSTPROCEDURE EVALUATION
Department of Anesthesiology  Postprocedure Note    Patient: Capri Esteban  MRN: [id number]  YOB: 1955  Date of evaluation: 3/6/2025    Procedure Summary       Date: 03/06/25 Room / Location: [facility] OR [address] [place] [address] OR    Anesthesia Start: 0917 Anesthesia Stop: 0949    Procedure: Left breast excisional biopsy with preop needle localization (Left: Breast) Diagnosis:       Abnormal finding on breast imaging      Breast lesion      (Abnormal finding on breast imaging [R92.8])      (Breast lesion [N64.9])    Surgeons: Navin Cota MD Responsible Provider: Edvin Vargas DO    Anesthesia Type: MAC ASA Status: 2            Anesthesia Type: MAC    Twyla Phase I: Tywla Score: 10    Twyla Phase II:      Anesthesia Post Evaluation    Patient location during evaluation: bedside  Patient participation: complete - patient participated  Level of consciousness: awake  Airway patency: patent  Nausea & Vomiting: no nausea  Cardiovascular status: hemodynamically stable  Respiratory status: acceptable  Hydration status: stable  Pain management: adequate    No notable events documented.

## 2025-03-06 NOTE — PROGRESS NOTES
Contact Type:  [facility]    Contact Information: Arrived with  for needle localization. Having breast surgery today with Dr. Cota.    Diagnosis: papilloma    Patient Expresses Need(s) For: n/a     Teaching Sheets/Booklets Provided: n/a    Notes: Procedure explained and questions addressed as needed.  Dr. Alvarado placed wire. Secured with gauze and tape per protocol. Transported patient with belongings to Same Day Surgery via wheelchair at 0835.

## 2025-03-06 NOTE — OP NOTE
[facility]  Operative Report    PATIENT NAME: Capri Esteban  MEDICAL RECORD NO. [id number]  SURGEON: Navin Cota MD MD FACS  Primary Care Physician: Navin Pedraza,   Date: 3/6/2025, 9:55 AM     PROCEDURE PERFORMED: Left Excisional Biopsy with Needle Loc Prior  PREOPERATIVE DIAGNOSIS: Left    Active Hospital Problems    Diagnosis Date Noted    Abnormal ultrasound of breast [R92.8] 02/24/2025    Abnormality of left breast on screening mammogram [R92.8] 02/24/2025      POSTOPERATIVE DIAGNOSIS: Same, path pending  SURGEON:  Navin Cota MD MD FACS  ANESTHESIA:  Monitored Local Anesthesia with Sedation  ESTIMATED BLOOD LOSS:  2 ml  SPECIMEN: Breast Tissue  COMPLICATIONS:  None; patient tolerated the procedure well.  DISPOSITION: Outpatient Surgery  CONDITION: stable      Indications: This patient presents with history of Left Mammographic abnormality      Procedure Details   The patient was seen in the Holding Area. The risks, benefits, complications, treatment options, and expected outcomes had been previously discussed with the patient. The possibilities of reaction to medication, pulmonary aspiration, bleeding, infection, the need for additional procedures, failure to diagnose a condition, and creating a complication requiring transfusion or operation were discussed with the patient. The patient concurred with the proposed plan, giving informed consent.  The site of surgery properly noted/marked.   The patient was brought to the operating room, placed supine on the operating room table, time, was taken, preoperative antibiotics were given, and signed consent on the chart. Sequential compression devices were in place. Previous needle localization had been performed.  The breast was prepped and draped with ChloraPrep.  After adequate sedation by anesthesia, and after adequate local anesthesia was induced, a transverse incision was made. The skin was incised with a 15 blade. The  excisional biopsy was performed by creating an oblique incision over the edge of the nipple-areolar complex junction with the skin from the 10:00 to the 1 o'clock position of the breastaround the previously placed localization guidewire.. Following the wire down, the portion of the breast needing excision as identified by needle localization, was excised sharply with Metzenbaum scissors and electrocautery.  Once the mass was excised, it was oriented for pathology with a margin map, placed on a grid and sent for x-ray confirmation. The biopsy site was then reinspected for hemostasis. Hemostasis was accomplished with electrocautery. . Once hemostasis was satisfactory, the wound was closed with 3-0 Vicryl for deep tissue, and 4-0 Vicryl running, subcuticular stitch for the skin.  Skin affix glue was applied, and the patient brought back to outpatient in stable condition. At the end of the procedure, sponge and needle counts correct. No apparent complications were noted.    '    Navin Cota MD, MD FACS  Electronically signed 3/6/2025 at 9:55 AM

## 2025-03-06 NOTE — ANESTHESIA PRE PROCEDURE
Prophylactic antiemetics administered.  Anesthetic plan and risks discussed with patient and spouse.      Plan discussed with CRNA.                ALEXANDRA GODDARD DO   3/6/2025

## 2025-03-06 NOTE — PROGRESS NOTES
Pt has met discharge criteria and states he is ready for discharge to home. IV removed, gauze and tape applied. Dressed in own clothes and personal belongings gathered. Discharge instructions (with opioid medication education information) given to pt and family; pt and family verbalized understanding of discharge instructions, prescriptions and follow up appointments. Pt transported to discharge lobby by [facility] staff.

## 2025-03-06 NOTE — DISCHARGE INSTRUCTIONS
[facility]      DR. JARRETT'S DISCHARGE INSTRUCTIONS    Pt Name: Capri Esteban  Medical Record Number: [id number]  Today's Date: 3/6/2025           GENERAL ANESTHESIA OR SEDATION:    [x]  Do not drive or operate hazardous machinery for 24 hours.  [x] Do not make important business or personal decisions for 24 hours.  [x] Do not drink alcoholic beverages or use tobacco for 24 hours.           ACTIVITY INSTRUCTIONS:    [] Rest today. Resume light to normal activity tomorrow.   [x] You may resume normal activity tomorrow. Do not engage in strenuous activity that may place stress on your incision.  [x] Do not drive  UNTIL NO LONGER TAKING NARCOTICS AND DAILY ACTIVITIES  ARE NEARLY NORMAL     [x]Avoid heavy lifting, tugging, pullings greater  than  25 lbs until seen in the office.               DIET INSTRUCTIONS:    []Begin with clear liquids. If not nauseated, may increase to a low-fat diet when you desire. Greasy and spicy foods are not advised.  [x]Regular diet as tolerated.  []Other:          MEDICATIONS  [x]Prescription sent with you to be used as directed.       Current Discharge Medication List        START taking these medications    Details   HYDROcodone-acetaminophen (NORCO) 5-325 MG per tablet Take 1 tablet by mouth every 6 hours as needed for Pain for up to 3 days. Intended supply: 3 days. Take lowest dose possible to manage pain Max Daily Amount: 4 tablets  Qty: 12 tablet, Refills: 0    Comments: Reduce doses taken as pain becomes manageable  Associated Diagnoses: S/P left breast biopsy                Do not drink alcohol or drive while taking these medications.   You may experience dizziness or drowsiness with these medications.   You may also experience constipation which can be relieved with stool softners or laxatives.    [x]You may resume your daily prescription medication schedule unless otherwise specified.  []Do not take 325mg Aspirin or other blood thinners such as Coumadin or

## 2025-03-06 NOTE — PROGRESS NOTES
Pt returned to SDS room 6. Vitals and assessment as charted. Pt has crackers and water. Family at the bedside. Pt and family verbalized understanding of discharge criteria and call light use. Call light in reach.

## 2025-03-06 NOTE — PROGRESS NOTES
Patient oriented to Same Day department and admitted to Same Day Surgery room 10.   Patient verbalized approval for first name, last initial with physician name on unit whiteboard.     Plan of care reviewed with patient.   Patient room whiteboard filled out and discussed with patient and responsible adult.   Patient and responsible adult offered Same Day Welcome Packet to review.    Call light in reach.   Bed in lowest position, locked, with one bed rail up.   SCDs and warming blanket in place.  Appropriate arm bands on patient.   Bathroom offered.   All questions and concerns of patient addressed.        Meds to Beds:   Patient informed of [facility]'s Meds to Beds program during admission. Patient is agreeable to program.   Contact information for the pharmacy and the Meds to Beds program:   David Esteban (Spouse)  [phone]

## 2025-03-07 ENCOUNTER — TELEPHONE (OUTPATIENT)
Dept: SURGERY | Age: 70
End: 2025-03-07

## 2025-03-07 NOTE — TELEPHONE ENCOUNTER
Post procedural phone call placed to patient. Patient not requiring  prescribed medication with adequate control of pain. Patients follow up appointment verified and confirmed in chart. Time spent for patient questions. Patient to follow up with office as needed.

## 2025-03-11 NOTE — PROGRESS NOTES
[facility]      Navin Cota MD FACS  General Surgery  Postprocedure Evaluation in Office  Pt Name: Capri Esteban  Date of Birth 1955   Today's Date: 3/12/2025  Medical Record Number: [id number]  Referring Provider: No ref. provider found  Primary Care Provider: Navin Pedraza DO  Chief Complaint   Patient presents with    Post-Op Check     S/P Left Excisional Biopsy with Needle Loc Prior 3/6/25     ASSESSMENT      Problem List Items Addressed This Visit       S/P left breast biopsy - Primary        PLANS   Assessment & Plan    Pathology still pending sent to [facility] for second opinion  We will make further plans when final result comes back, it was only 5 mm by ultrasound the size is not really measured on pathology report.  If it does indeed come back we will plan for reexcision of margins deep medial and caudal.  There are no Patient Instructions on file for this visit.  Follow up: No follow-ups on file.  Orders Placed This Encounter:  No orders of the defined types were placed in this encounter.        SUBJECTIVE   Subjective   Capri is seen today for post-op follow-up. She is 6 day(s) status post excisional biopsy left breast for atypical papillary lesions seen on core biopsy. She is tolerating a regular diet, having regular bowel movements. Symptoms and activity have gradually improved compared to preoperative. The surgical site is clean and has no drainage. Pain is controlled without any medications.. She has compliant with postoperative instructions.  Past Medical History  Past Medical History:   Diagnosis Date    Cancer (HCC) 01/2018    skin    GERD (gastroesophageal reflux disease) 2023    Hyperlipidemia      Past Surgical History  Past Surgical History:   Procedure Laterality Date    BREAST ENHANCEMENT SURGERY Bilateral 1980    BREAST ENHANCEMENT SURGERY Bilateral 06/01/2020    BILATERAL IMPLANT EXCHANGE WITH CAPSULECTOMIES performed by Kenny Cuevas MD

## 2025-03-12 ENCOUNTER — OFFICE VISIT (OUTPATIENT)
Dept: SURGERY | Age: 70
End: 2025-03-12

## 2025-03-12 VITALS
OXYGEN SATURATION: 100 % | SYSTOLIC BLOOD PRESSURE: 138 MMHG | DIASTOLIC BLOOD PRESSURE: 80 MMHG | HEART RATE: 83 BPM | BODY MASS INDEX: 25.1 KG/M2 | HEIGHT: 64 IN | WEIGHT: 147 LBS | TEMPERATURE: 97.8 F

## 2025-03-12 DIAGNOSIS — Z98.890 S/P LEFT BREAST BIOPSY: Primary | ICD-10-CM

## 2025-03-12 PROCEDURE — 99024 POSTOP FOLLOW-UP VISIT: CPT | Performed by: SURGERY

## 2025-03-19 ENCOUNTER — OFFICE VISIT (OUTPATIENT)
Dept: SURGERY | Age: 70
End: 2025-03-19
Payer: MEDICARE

## 2025-03-19 VITALS
DIASTOLIC BLOOD PRESSURE: 72 MMHG | BODY MASS INDEX: 25.22 KG/M2 | OXYGEN SATURATION: 97 % | SYSTOLIC BLOOD PRESSURE: 140 MMHG | TEMPERATURE: 97.3 F | HEIGHT: 64 IN | HEART RATE: 101 BPM

## 2025-03-19 DIAGNOSIS — D05.12 DUCTAL CARCINOMA IN SITU (DCIS) OF LEFT BREAST: Primary | ICD-10-CM

## 2025-03-19 DIAGNOSIS — Z98.890 S/P LEFT BREAST BIOPSY: ICD-10-CM

## 2025-03-19 PROCEDURE — G8400 PT W/DXA NO RESULTS DOC: HCPCS | Performed by: SURGERY

## 2025-03-19 PROCEDURE — G8419 CALC BMI OUT NRM PARAM NOF/U: HCPCS | Performed by: SURGERY

## 2025-03-19 PROCEDURE — 3017F COLORECTAL CA SCREEN DOC REV: CPT | Performed by: SURGERY

## 2025-03-19 PROCEDURE — 1036F TOBACCO NON-USER: CPT | Performed by: SURGERY

## 2025-03-19 PROCEDURE — 1159F MED LIST DOCD IN RCRD: CPT | Performed by: SURGERY

## 2025-03-19 PROCEDURE — 99212 OFFICE O/P EST SF 10 MIN: CPT | Performed by: SURGERY

## 2025-03-19 PROCEDURE — 1090F PRES/ABSN URINE INCON ASSESS: CPT | Performed by: SURGERY

## 2025-03-19 PROCEDURE — G9899 SCRN MAM PERF RSLTS DOC: HCPCS | Performed by: SURGERY

## 2025-03-19 PROCEDURE — 1123F ACP DISCUSS/DSCN MKR DOCD: CPT | Performed by: SURGERY

## 2025-03-19 PROCEDURE — G8427 DOCREV CUR MEDS BY ELIG CLIN: HCPCS | Performed by: SURGERY

## 2025-03-19 NOTE — PROGRESS NOTES
hematoma.  NEUROLOGIC: No sensory or motor nerve irritation  EXTREMITIES: no cyanosis, no clubbing and no edema.           Navin Cota MD FACS  3/19/2025    The patients records and films were reviewed independently.  Time was given for questions . All questions were answered to the patients satisfaction. A total time of 25 minutes  was spent with at least 51% related to counseling and coordination of care discussing   Problem List Items Addressed This Visit       S/P left breast biopsy    Relevant Orders    Ambulatory referral to Radiation Oncology    Ductal carcinoma in situ (DCIS) of left breast - Primary    Relevant Orders    Ambulatory referral to Radiation Oncology      11:19 AM

## 2025-03-20 PROBLEM — D05.92 BREAST CANCER, STAGE 0, LEFT: Status: ACTIVE | Noted: 2025-03-20

## 2025-03-20 PROBLEM — C50.212 MALIGNANT NEOPLASM OF UPPER-INNER QUADRANT OF LEFT BREAST IN FEMALE, ESTROGEN RECEPTOR POSITIVE: Status: RESOLVED | Noted: 2025-03-20 | Resolved: 2025-03-20

## 2025-03-20 PROBLEM — Z17.0 MALIGNANT NEOPLASM OF UPPER-INNER QUADRANT OF LEFT BREAST IN FEMALE, ESTROGEN RECEPTOR POSITIVE: Status: RESOLVED | Noted: 2025-03-20 | Resolved: 2025-03-20

## 2025-03-20 PROBLEM — C50.212 MALIGNANT NEOPLASM OF UPPER-INNER QUADRANT OF LEFT BREAST IN FEMALE, ESTROGEN RECEPTOR POSITIVE: Status: ACTIVE | Noted: 2025-03-20

## 2025-03-20 PROBLEM — Z17.0 MALIGNANT NEOPLASM OF UPPER-INNER QUADRANT OF LEFT BREAST IN FEMALE, ESTROGEN RECEPTOR POSITIVE: Status: ACTIVE | Noted: 2025-03-20

## 2025-03-21 DIAGNOSIS — D05.12 DUCTAL CARCINOMA IN SITU (DCIS) OF LEFT BREAST: Primary | ICD-10-CM

## 2025-03-26 ENCOUNTER — HOSPITAL ENCOUNTER (OUTPATIENT)
Dept: RADIATION ONCOLOGY | Age: 70
Discharge: HOME | End: 2025-03-26
Payer: MEDICARE

## 2025-03-26 VITALS
DIASTOLIC BLOOD PRESSURE: 78 MMHG | HEIGHT: 64 IN | TEMPERATURE: 97.9 F | RESPIRATION RATE: 16 BRPM | HEART RATE: 80 BPM | SYSTOLIC BLOOD PRESSURE: 132 MMHG | WEIGHT: 152.2 LBS | OXYGEN SATURATION: 93 % | BODY MASS INDEX: 25.99 KG/M2

## 2025-03-26 DIAGNOSIS — Z17.0 MALIGNANT NEOPLASM OF UPPER-INNER QUADRANT OF LEFT BREAST IN FEMALE, ESTROGEN RECEPTOR POSITIVE: ICD-10-CM

## 2025-03-26 DIAGNOSIS — D05.92 BREAST CANCER, STAGE 0, LEFT: Primary | ICD-10-CM

## 2025-03-26 DIAGNOSIS — D05.12 DUCTAL CARCINOMA IN SITU (DCIS) OF LEFT BREAST: Primary | ICD-10-CM

## 2025-03-26 DIAGNOSIS — C50.212 MALIGNANT NEOPLASM OF UPPER-INNER QUADRANT OF LEFT BREAST IN FEMALE, ESTROGEN RECEPTOR POSITIVE: ICD-10-CM

## 2025-03-26 PROCEDURE — 99205 OFFICE O/P NEW HI 60 MIN: CPT

## 2025-03-26 PROCEDURE — 99202 OFFICE O/P NEW SF 15 MIN: CPT | Performed by: RADIOLOGY

## 2025-03-26 ASSESSMENT — ENCOUNTER SYMPTOMS
VOMITING: 0
BACK PAIN: 0
CONSTIPATION: 0
BLOOD IN STOOL: 0
NAUSEA: 0
ABDOMINAL PAIN: 0
TROUBLE SWALLOWING: 0
RECTAL PAIN: 0
COUGH: 0
SHORTNESS OF BREATH: 0
DIARRHEA: 0

## 2025-03-26 NOTE — PROGRESS NOTES
(Infrared)   Resp 16   Ht 1.626 m (5' 4.02\")   Wt 69 kg (152 lb 3.2 oz)   SpO2 93%   BMI 26.11 kg/m²     ECO - Asymptomatic (Fully active, able to carry on all pre-disease activities without restriction)    Physical Exam  Constitutional:       General: She is not in acute distress.     Appearance: Normal appearance.   HENT:      Head: Normocephalic and atraumatic.   Pulmonary:      Effort: Pulmonary effort is normal. No respiratory distress.   Abdominal:      General: Abdomen is flat.   Neurological:      General: No focal deficit present.      Mental Status: She is alert and oriented to person, place, and time.         Past Medical History:   Diagnosis Date    Cancer (HCC)     Skin (L Arm); L Breast ()    GERD (gastroesophageal reflux disease)     Hyperlipidemia        Past Surgical History:   Procedure Laterality Date    BREAST ENHANCEMENT SURGERY Bilateral     BREAST ENHANCEMENT SURGERY Bilateral 2020    BILATERAL IMPLANT EXCHANGE WITH CAPSULECTOMIES performed by Kenny Cuevas MD at [facility] OR    BREAST SURGERY Left 3/6/2025    Left breast excisional biopsy with preop needle localization performed by Navin Cota MD at [facility] OR     SECTION      COLONOSCOPY      COLONOSCOPY W/ POLYPECTOMY  2023    Dr. Medellin    ESOPHAGOGASTRODUODENOSCOPY  2023    Dr. Medellin    [facility] US GUID NDL BIOPSY LEFT Left 2025    [facility] US GUID NDL BIOPSY LEFT 2025 [facility]    US BREAST BIOPSY W LOC DEVICE EACH ADDL LESION LEFT Left 2025    US BREAST BIOPSY W LOC DEVICE EACH ADDL LESION LEFT 2025 [facility]    US PLACE BREAST LOC DEVICE 1ST LESION LEFT Left 3/6/2025    US PLACE BREAST LOC DEVICE 1ST LESION LEFT 3/6/2025 [facility]       Family History   Problem Relation Age of Onset    Colon Cancer Mother 66    Cancer Father         Lung cancer    Diabetes Father     COPD Father     Prostate Cancer Father 76    Breast Cancer

## 2025-03-28 ENCOUNTER — TELEPHONE (OUTPATIENT)
Dept: CASE MANAGEMENT | Age: 70
End: 2025-03-28

## 2025-03-28 ENCOUNTER — SOCIAL WORK (OUTPATIENT)
Dept: RADIATION ONCOLOGY | Age: 70
End: 2025-03-28

## 2025-03-28 NOTE — PROGRESS NOTES
[employer]  Oncology Social Worker      Electronically signed by ROGELIO Esqueda, [employer] on 3/28/2025 at 9:51 AM

## 2025-03-28 NOTE — TELEPHONE ENCOUNTER
Name: Capri Esteban  : 1955  MRN: [id number]    Oncology Navigation- Initial Note:    Intake-  Contact Type: Telephone-teaching via phone with Breast Navigator    Diagnosis: Breast-malignant    Home Disposition: Lives with other who is able to assist, spouse David \"Carson\"    Patient needs and barriers to care: Coordination of Care, Knowledge deficit, and Emotional Issues/ Fear/ Anxiety, \"trying to make decision about left mastectomy with or without reconstruction. Waiting for Dr. Gonzalez's office to call with appt. Really don't want radiation.\"     Referral Source: Outpatient-radiation dept    Receptive to Advanced Care Planning/ Palliative Care:  N/A, stage 0    Interventions-      General Interventions:  Patient contacted after Erika FAJARDO notified me of her. Originally had surgical excision with Dr. Cota and path sent to [facility]. Upgraded to DCIS. Erika/Dr. Claudio saw patient 3/26 to discuss radiation. Called patient today to introduced myself, navigation role, and to initiate teaching. Plan to complete teaching when sees Dr. Delgado  and genetic lab if patient agrees.      Genetics/Family History: Personal history of skin cancer-left arm, colon polyps, and DCIS (current), age 69. Family History: mother (only child) colon cancer age 66, negative genetic testing, patient unsure what genes tested, done , Father (only had 1 brother)  lung cancer and prostate cancer age 76, patient's only siblings: 1 sister and 1 brother did not have any cancers, [facility] breast cancer age 70-79, paternal uncle no cancer. Meets criteria for genetic counseling and testing if patient agrees. Will let me know at appt on .    Education/Screenings:  DCIS, grade and stage, receptors, genetic testing, surgery lumpectomy versus mastectomy, reviewed radiation side effects, antiestrogen therapy. Questions addressed, verbalizes understanding. Individual shared decision making utilized. Will give new patient

## 2025-03-31 ENCOUNTER — OFFICE VISIT (OUTPATIENT)
Dept: SURGERY | Age: 70
End: 2025-03-31
Payer: MEDICARE

## 2025-03-31 VITALS
TEMPERATURE: 97.5 F | HEART RATE: 90 BPM | SYSTOLIC BLOOD PRESSURE: 132 MMHG | OXYGEN SATURATION: 98 % | DIASTOLIC BLOOD PRESSURE: 82 MMHG | RESPIRATION RATE: 18 BRPM

## 2025-03-31 DIAGNOSIS — D05.12 DUCTAL CARCINOMA IN SITU (DCIS) OF LEFT BREAST: Primary | ICD-10-CM

## 2025-03-31 DIAGNOSIS — Z98.890 S/P LEFT BREAST BIOPSY: ICD-10-CM

## 2025-03-31 PROCEDURE — 3017F COLORECTAL CA SCREEN DOC REV: CPT | Performed by: SURGERY

## 2025-03-31 PROCEDURE — G9899 SCRN MAM PERF RSLTS DOC: HCPCS | Performed by: SURGERY

## 2025-03-31 PROCEDURE — 1159F MED LIST DOCD IN RCRD: CPT | Performed by: SURGERY

## 2025-03-31 PROCEDURE — 99214 OFFICE O/P EST MOD 30 MIN: CPT | Performed by: SURGERY

## 2025-03-31 PROCEDURE — 1036F TOBACCO NON-USER: CPT | Performed by: SURGERY

## 2025-03-31 PROCEDURE — 1090F PRES/ABSN URINE INCON ASSESS: CPT | Performed by: SURGERY

## 2025-03-31 PROCEDURE — G8427 DOCREV CUR MEDS BY ELIG CLIN: HCPCS | Performed by: SURGERY

## 2025-03-31 PROCEDURE — G8419 CALC BMI OUT NRM PARAM NOF/U: HCPCS | Performed by: SURGERY

## 2025-03-31 PROCEDURE — 1126F AMNT PAIN NOTED NONE PRSNT: CPT | Performed by: SURGERY

## 2025-03-31 PROCEDURE — G8400 PT W/DXA NO RESULTS DOC: HCPCS | Performed by: SURGERY

## 2025-03-31 PROCEDURE — 1123F ACP DISCUSS/DSCN MKR DOCD: CPT | Performed by: SURGERY

## 2025-03-31 NOTE — PROGRESS NOTES
[facility]      Navin Cota MD FACS  General Surgery  Evaluation in Office  Pt Name: Capri Esteban  Date of Birth 1955   Today's Date: 3/31/2025  Medical Record Number: [id number]  Referring Provider: No ref. provider found  Primary Care Provider: Navin Pedraza,   Chief Complaint   Patient presents with    Post-Op Check     S/P Left Excisional Biopsy with Needle Loc Prior 3/6/25-Last seen 3/24/25-Discuss reexcision     ASSESSMENT      Problem List Items Addressed This Visit       S/P left breast biopsy    Ductal carcinoma in situ (DCIS) of left breast - Primary    Relevant Orders    NM LYMPHOSCINTIGRAM    MASTECTOMY COMPLETE / SIMPLE    BIOPSY / EXCISION SENTINEL NODE DEEP AXILLARY    External Referral To Plastic Surgery        PLANS   Assessment & Plan    After meeting with radiation oncology she was favoring mastectomy with or without reconstruction  We had a long discussion today in the office regarding all of this and she seemed to waver back-and-forth reexcision of margins followed by radiation or mastectomy and we spent over 30 minutes going over all of this again.  My feeling is is that she worries about this so much I do not think that she is going to be satisfied with a reexcision and radiation that her concern level so high she wants to have a mastectomy.  We then went around and around about reconstruction or no reconstruction since she has an implant in place already that she had this done originally because she wanted her certain look and that she is used to that and does like it and eventually agreed that she would like to proceed with simple mastectomy and reconstruction and so we made a referral to Dr. Gonzalez so he could have a discussion with her regarding options for the left breast.  Pathology returned DCIS with involved posterior deep margin and close medial lateral cranial and skin.  Skin margin is nipple it was less than 2 mm but not at the margin and

## 2025-04-02 ENCOUNTER — CLINICAL DOCUMENTATION (OUTPATIENT)
Dept: CASE MANAGEMENT | Age: 70
End: 2025-04-02

## 2025-04-02 ENCOUNTER — OFFICE VISIT (OUTPATIENT)
Dept: ONCOLOGY | Age: 70
End: 2025-04-02
Payer: MEDICARE

## 2025-04-02 ENCOUNTER — HOSPITAL ENCOUNTER (OUTPATIENT)
Dept: INFUSION THERAPY | Age: 70
Discharge: HOME | End: 2025-04-02
Payer: MEDICARE

## 2025-04-02 VITALS
HEART RATE: 72 BPM | TEMPERATURE: 97.8 F | DIASTOLIC BLOOD PRESSURE: 76 MMHG | OXYGEN SATURATION: 98 % | RESPIRATION RATE: 16 BRPM | SYSTOLIC BLOOD PRESSURE: 148 MMHG

## 2025-04-02 VITALS
OXYGEN SATURATION: 98 % | DIASTOLIC BLOOD PRESSURE: 76 MMHG | WEIGHT: 150 LBS | HEART RATE: 72 BPM | HEIGHT: 64 IN | RESPIRATION RATE: 16 BRPM | TEMPERATURE: 97.8 F | SYSTOLIC BLOOD PRESSURE: 148 MMHG | BODY MASS INDEX: 25.61 KG/M2

## 2025-04-02 DIAGNOSIS — D05.12 DUCTAL CARCINOMA IN SITU (DCIS) OF LEFT BREAST: Primary | ICD-10-CM

## 2025-04-02 PROCEDURE — 1123F ACP DISCUSS/DSCN MKR DOCD: CPT | Performed by: INTERNAL MEDICINE

## 2025-04-02 PROCEDURE — 99204 OFFICE O/P NEW MOD 45 MIN: CPT | Performed by: INTERNAL MEDICINE

## 2025-04-02 PROCEDURE — G8419 CALC BMI OUT NRM PARAM NOF/U: HCPCS | Performed by: INTERNAL MEDICINE

## 2025-04-02 PROCEDURE — 1126F AMNT PAIN NOTED NONE PRSNT: CPT | Performed by: INTERNAL MEDICINE

## 2025-04-02 PROCEDURE — 3017F COLORECTAL CA SCREEN DOC REV: CPT | Performed by: INTERNAL MEDICINE

## 2025-04-02 PROCEDURE — G8427 DOCREV CUR MEDS BY ELIG CLIN: HCPCS | Performed by: INTERNAL MEDICINE

## 2025-04-02 PROCEDURE — 1036F TOBACCO NON-USER: CPT | Performed by: INTERNAL MEDICINE

## 2025-04-02 PROCEDURE — 1159F MED LIST DOCD IN RCRD: CPT | Performed by: INTERNAL MEDICINE

## 2025-04-02 PROCEDURE — G9899 SCRN MAM PERF RSLTS DOC: HCPCS | Performed by: INTERNAL MEDICINE

## 2025-04-02 PROCEDURE — G8400 PT W/DXA NO RESULTS DOC: HCPCS | Performed by: INTERNAL MEDICINE

## 2025-04-02 PROCEDURE — 99211 OFF/OP EST MAY X REQ PHY/QHP: CPT

## 2025-04-02 PROCEDURE — 1090F PRES/ABSN URINE INCON ASSESS: CPT | Performed by: INTERNAL MEDICINE

## 2025-04-02 NOTE — PROGRESS NOTES
Saturation:  No components found for: \"PERCENTFE\"  TIBC:  No results found for: \"TIBC\"  FERRITIN:  No results found for: \"FERRITIN\"  PSA: No results found for: \"PSA\"         IMAGING:  US PLACE BREAST LOC DEVICE 1ST LESION LEFT  Result Date: 3/6/2025  1. Successful left breast needle wire localization. 2. Post mammographic images demonstrates the wire in the appropriate position. 3. Specimen radiograph demonstrates the wire and U shaped biopsy clip within the specimen. BI-RADS Final Assessment Category: Not applicable. **This report has been created using voice recognition software. It may contain minor errors which are inherent in voice recognition technology.** Electronically signed by Dr. Priti Alvarado Performing Facility: [place] [address] Phone: [phone] mam POST BX CLIP PLACEMENT LEFT  Result Date: 3/6/2025  1. Successful left breast needle wire localization. 2. Post mammographic images demonstrates the wire in the appropriate position. 3. Specimen radiograph demonstrates the wire and U shaped biopsy clip within the specimen. BI-RADS Final Assessment Category: Not applicable. **This report has been created using voice recognition software. It may contain minor errors which are inherent in voice recognition technology.** Electronically signed by Dr. Priti Alvarado Performing Facility: [place] [address] Phone: [phone] mam BREAST SPECIMEN  Result Date: 3/6/2025  1. Successful left breast needle wire localization. 2. Post mammographic images demonstrates the wire in the appropriate position. 3. Specimen radiograph demonstrates the wire and U shaped biopsy clip within the specimen. BI-RADS Final Assessment Category: Not applicable. **This report has been created using voice recognition software. It may contain minor errors which are inherent in

## 2025-04-02 NOTE — PROGRESS NOTES
Name: Capri Esteban  : 1955  MRN: [id number]    Oncology Navigation Follow-Up Note    Contact Type:  Medical Oncology    Diagnosis:  left breast DCIS ER+, AR+    Plan of Care:  Patient decided on left mastectomy and immediate reconstruction (saw Dr. Gonzalez ), likely omit radiation, no chemo recommended for DCIS, AI to be discussed next visit per Dr. Delgado.    Education:  Pre-op information including sentinel node and pre-hab. New patient packet, pre-op packet, post-op pillow given. Individualized shared decision making utilized.     Referrals: Oncology Prehab/Rehab-dept will call patient with pre-op eval appt    Assistance Needed: Anxious about surgery scheduling since surgeons need to coordinate when both available. Emotional support offered. Also expresses worry  about \"possible other spots of cancer in my breasts\" wants breast MRI for reassurance. Informed her MRI can lead to additional imaging and possible biopsies before surgery. Declined genetic counseling and testing due to fear of losing life insurance or denied disability in future.    Continuum of Care: Diagnosis/Active Treatment    Notes: Questions addressed. Verbalizes understanding of all information today. Escorted to check-out. Will monitor for MRI results and continue to follow patient. Has our contact number if any needs arise.    Electronically signed by Brandee Whitt RN on 2025 at 10:34 AM

## 2025-04-02 NOTE — PATIENT INSTRUCTIONS
Orders Placed This Encounter   Procedures    MRI BREAST BILATERAL W WO CONTRAST    [facility]     Return in about 7 weeks (around 5/21/2025).MD visit+ labs+ post surgery visit( 2-3 weeks after surgery)

## 2025-04-03 ENCOUNTER — PREP FOR PROCEDURE (OUTPATIENT)
Dept: SURGERY | Age: 70
End: 2025-04-03

## 2025-04-03 ENCOUNTER — TELEPHONE (OUTPATIENT)
Dept: SURGERY | Age: 70
End: 2025-04-03

## 2025-04-03 DIAGNOSIS — D05.12 DUCTAL CARCINOMA IN SITU OF LEFT BREAST: ICD-10-CM

## 2025-04-03 NOTE — TELEPHONE ENCOUNTER
Patient scheduled for surgery with Dr. Cota on 05- at 7:30am with an arrival time of 6:00am at [facility] for her sentinel lymph node injection.  Surgery was coordinated with Dr. Gonzalez      Patient to report to the [facility] second floor same day surgery.  Nothing to eat after midnight.  Can have 12- oz of clear liquids every 4-hours from midnight until 2-hours prior to their arrival time at the hospital.  Preop testing to be done at least 2-weeks prior to their procedure.  Antibacterial soap given.  No jewelry or piercing's and they must have a .

## 2025-04-08 ENCOUNTER — HOSPITAL ENCOUNTER (OUTPATIENT)
Dept: PHYSICAL THERAPY | Age: 70
Setting detail: THERAPIES SERIES
Discharge: HOME | End: 2025-04-08
Attending: INTERNAL MEDICINE
Payer: MEDICARE

## 2025-04-08 PROCEDURE — 97110 THERAPEUTIC EXERCISES: CPT

## 2025-04-08 PROCEDURE — 97161 PT EVAL LOW COMPLEX 20 MIN: CPT

## 2025-04-08 NOTE — PROGRESS NOTES
Lymphedema Management, Manual Techniques, Pain Management, Neuropathy Management, Postural Re-Training, Body Mechanics/Ergonomics, Home Exercise Prescription, and Safety Education    Plan of care initiated.  Plan to see patient up to 2 times per week for up to 12 weeks to address the treatment planned outlined above, with next appointment to be 3 weeks following surgery per protocol.    Time In 1200   Time Out 1245   Timed Code Minutes: 10 min   Total Treatment Time: 45 min       Electronically Signed by: Brandee Odonnell, PT

## 2025-04-14 ENCOUNTER — CLINICAL DOCUMENTATION (OUTPATIENT)
Dept: CASE MANAGEMENT | Age: 70
End: 2025-04-14

## 2025-04-14 ENCOUNTER — TELEPHONE (OUTPATIENT)
Dept: CASE MANAGEMENT | Age: 70
End: 2025-04-14

## 2025-04-14 LAB
CREAT BLD-MCNC: 0.9 MG/DL (ref 0.5–1.2)
GFR SERPL CREATININE-BSD FRML MDRD: 69 ML/MIN/1.73M2

## 2025-04-14 PROCEDURE — 82565 ASSAY OF CREATININE: CPT

## 2025-04-14 NOTE — TELEPHONE ENCOUNTER
Name: Capri Esteban  : 1955  MRN: [id number]    Oncology Navigation Follow-Up Note    Contact Type:  Telephone    Notes: Returned call to patient about breast MRI. Patient notified us that she was unable to get MRI at [facility] this morning because she has breast implants. Scan cancelled by [facility] and rescheduled for . Patient expresses frustration that questionnaire does not ask about implants and MRI tech started IV and test before she was told MRI cannot be completed. MRI order from Dr. Delgado documents reason as:     Reason for Exam    Pt with DCIS left breast, status post surgery, plans for mastectomy, has implants   Dx: Ductal carcinoma in situ (DCIS) of left breast [D05.12 (ICD-10-CM)]     Patient concerned MRI now delayed until . Offered to call dept to see if sooner date available.      Electronically signed by Brandee Whitt RN on 2025 at 2:15 PM

## 2025-04-15 NOTE — PROGRESS NOTES
Name: Capri Esteban  : 1955  MRN: [id number]    Oncology Navigation Follow-Up Note    Contact Type:  Telephone    Notes: Zoe Staples-MRI supervisor off currently per MRI dept. Told to call Katie Bloom. Katie found cancellation on  at 630 am at [facility]. Moved patient to that. Called patient, no answer. Left voicemail asking patient to call back. Did leave on [facility] appt details as above. Needs to arrive at 615 am. Asked patient to call back to confirm she is aware.      Electronically signed by Brandee Whitt RN on 4/15/2025 at 11:40 AM

## 2025-04-17 ENCOUNTER — HOSPITAL ENCOUNTER (OUTPATIENT)
Dept: MRI IMAGING | Age: 70
Discharge: HOME | End: 2025-04-14
Attending: INTERNAL MEDICINE
Payer: MEDICARE

## 2025-04-17 DIAGNOSIS — D05.12 DUCTAL CARCINOMA IN SITU (DCIS) OF LEFT BREAST: ICD-10-CM

## 2025-04-21 ENCOUNTER — TELEPHONE (OUTPATIENT)
Dept: ONCOLOGY | Age: 70
End: 2025-04-21

## 2025-04-21 NOTE — TELEPHONE ENCOUNTER
Patient is calling because she had a bilateral breast MRI done 4/17 and wants to know the results, I let her know that Dr. Delgado is not back until next week. She would like to know the results now to know what the plan is for upcoming surgery and does not want to wait until Dr. Delgado returns to clinic next week. Please advise.

## 2025-04-30 ENCOUNTER — HOSPITAL ENCOUNTER (OUTPATIENT)
Dept: WOMENS IMAGING | Age: 70
Discharge: HOME | End: 2025-04-30
Attending: FAMILY MEDICINE
Payer: MEDICARE

## 2025-04-30 DIAGNOSIS — Z78.0 POST-MENOPAUSAL: ICD-10-CM

## 2025-04-30 PROCEDURE — 77080 DXA BONE DENSITY AXIAL: CPT

## 2025-05-01 ENCOUNTER — RESULTS FOLLOW-UP (OUTPATIENT)
Dept: FAMILY MEDICINE CLINIC | Age: 70
End: 2025-05-01

## 2025-05-05 ENCOUNTER — OFFICE VISIT (OUTPATIENT)
Dept: SURGERY | Age: 70
End: 2025-05-05
Payer: MEDICARE

## 2025-05-05 VITALS
RESPIRATION RATE: 18 BRPM | SYSTOLIC BLOOD PRESSURE: 124 MMHG | TEMPERATURE: 97.3 F | BODY MASS INDEX: 25.75 KG/M2 | OXYGEN SATURATION: 95 % | DIASTOLIC BLOOD PRESSURE: 86 MMHG | HEART RATE: 92 BPM | HEIGHT: 64 IN

## 2025-05-05 DIAGNOSIS — D05.12 DUCTAL CARCINOMA IN SITU (DCIS) OF LEFT BREAST: Primary | ICD-10-CM

## 2025-05-05 PROCEDURE — 3017F COLORECTAL CA SCREEN DOC REV: CPT | Performed by: SURGERY

## 2025-05-05 PROCEDURE — G8427 DOCREV CUR MEDS BY ELIG CLIN: HCPCS | Performed by: SURGERY

## 2025-05-05 PROCEDURE — G9899 SCRN MAM PERF RSLTS DOC: HCPCS | Performed by: SURGERY

## 2025-05-05 PROCEDURE — 1036F TOBACCO NON-USER: CPT | Performed by: SURGERY

## 2025-05-05 PROCEDURE — 1123F ACP DISCUSS/DSCN MKR DOCD: CPT | Performed by: SURGERY

## 2025-05-05 PROCEDURE — 1126F AMNT PAIN NOTED NONE PRSNT: CPT | Performed by: SURGERY

## 2025-05-05 PROCEDURE — 99214 OFFICE O/P EST MOD 30 MIN: CPT | Performed by: SURGERY

## 2025-05-05 PROCEDURE — 1090F PRES/ABSN URINE INCON ASSESS: CPT | Performed by: SURGERY

## 2025-05-05 PROCEDURE — G8399 PT W/DXA RESULTS DOCUMENT: HCPCS | Performed by: SURGERY

## 2025-05-05 PROCEDURE — G8419 CALC BMI OUT NRM PARAM NOF/U: HCPCS | Performed by: SURGERY

## 2025-05-05 PROCEDURE — 1159F MED LIST DOCD IN RCRD: CPT | Performed by: SURGERY

## 2025-05-05 NOTE — PROGRESS NOTES
[facility]      Navin Cota MD FACS  General Surgery  Evaluation in Office  Pt Name: Capri Esteban  Date of Birth 1955   Today's Date: 5/5/2025  Medical Record Number: [id number]  Referring Provider: No ref. provider found  Primary Care Provider: Navin Pedraza,   Chief Complaint   Patient presents with    Follow-up     Discuss surgery     ASSESSMENT      Problem List Items Addressed This Visit       Ductal carcinoma in situ (DCIS) of left breast - Primary    Relevant Orders    NM LYMPHOSCINTIGRAM    MASTECTOMY COMPLETE / SIMPLE    BIOPSY / EXCISION SENTINEL NODE DEEP AXILLARY    MASTECTOMY COMPLETE / SIMPLE        PLANS   Assessment & Plan    After meeting with radiation oncology and plastic surgery she is decided on a left simple mastectomy with immediate reconstruction.  When I saw her in the office after that she actually told me that she wanted to have bilateral mastectomy and reconstruction  She then saw Dr. Gonzalez she talked about having the left side done only after he said that she did not need to have the right side done because there is no cancer in it  Has been my feeling along that she is not going to tolerate having any breast tissue left that the concern over having more biopsies and mammogram findings and other issues as she insisted on having a breast MRI even though her breast mammogram was very easy to read because she was so concerned that there is going to be something in the other side as well.  So at this point after discussing this in the office today that she wants to have both sides done we did make contact with plastic surgery in the OR and there is time to move forward with the bilateral with a prophylactic on the right-hand side and as of the end of office today that was her decision  There are no Patient Instructions on file for this visit.  Follow up: No follow-ups on file.  Orders Placed This Encounter:  Orders Placed This Encounter   Procedures

## 2025-05-11 NOTE — H&P
H and P for Surgery           [facility]      Navin Cota MD FACS  General Surgery  Evaluation in Office  Pt Name: Capri Esteban  Date of Birth 1955   Today's Date: 5/11/2025  Medical Record Number: [id number]  Referring Provider: No ref. provider found  Primary Care Provider: Navin Pedraza,   No chief complaint on file.    ASSESSMENT      Problem List Items Addressed This Visit    None         PLANS   Assessment & Plan    After meeting with radiation oncology and plastic surgery she is decided on a left simple mastectomy with immediate reconstruction.  When I saw her in the office after that she actually told me that she wanted to have bilateral mastectomy and reconstruction  She then saw Dr. Gonzalez she talked about having the left side done only after he said that she did not need to have the right side done because there is no cancer in it  Has been my feeling along that she is not going to tolerate having any breast tissue left that the concern over having more biopsies and mammogram findings and other issues as she insisted on having a breast MRI even though her breast mammogram was very easy to read because she was so concerned that there is going to be something in the other side as well.  So at this point after discussing this in the office today that she wants to have both sides done we did make contact with plastic surgery in the OR and there is time to move forward with the bilateral with a prophylactic on the right-hand side and as of the end of office today that was her decision  There are no outpatient Patient Instructions on file for this admission.  Follow up: No follow-ups on file.  Orders Placed This Encounter:  No orders of the defined types were placed in this encounter.        SUBJECTIVE   Subjective   Capri is seen today for post-op follow-up. She is 60 day(s) status post excisional biopsy left breast for atypical papillary lesions seen on core biopsy.  This was

## 2025-05-12 ENCOUNTER — HOSPITAL ENCOUNTER (OUTPATIENT)
Age: 70
Discharge: HOME | End: 2025-05-13
Attending: SURGERY | Admitting: SURGERY
Payer: MEDICARE

## 2025-05-12 ENCOUNTER — ANESTHESIA EVENT (OUTPATIENT)
Dept: OPERATING ROOM | Age: 70
End: 2025-05-12
Payer: MEDICARE

## 2025-05-12 ENCOUNTER — HOSPITAL ENCOUNTER (OUTPATIENT)
Dept: NUCLEAR MEDICINE | Age: 70
Discharge: HOME | End: 2025-05-12
Attending: SURGERY
Payer: MEDICARE

## 2025-05-12 ENCOUNTER — ANESTHESIA (OUTPATIENT)
Dept: OPERATING ROOM | Age: 70
End: 2025-05-12
Payer: MEDICARE

## 2025-05-12 DIAGNOSIS — Z90.13 S/P BILATERAL MASTECTOMY: Primary | ICD-10-CM

## 2025-05-12 DIAGNOSIS — D05.12 DUCTAL CARCINOMA IN SITU (DCIS) OF LEFT BREAST: ICD-10-CM

## 2025-05-12 PROBLEM — D05.10 DCIS (DUCTAL CARCINOMA IN SITU) OF BREAST: Status: ACTIVE | Noted: 2025-05-12

## 2025-05-12 PROCEDURE — 38792 RA TRACER ID OF SENTINL NODE: CPT | Performed by: SURGERY

## 2025-05-12 PROCEDURE — 6360000002 HC RX W HCPCS: Performed by: NURSE ANESTHETIST, CERTIFIED REGISTERED

## 2025-05-12 PROCEDURE — 6370000000 HC RX 637 (ALT 250 FOR IP): Performed by: SURGERY

## 2025-05-12 PROCEDURE — 3700000001 HC ADD 15 MINUTES (ANESTHESIA): Performed by: SURGERY

## 2025-05-12 PROCEDURE — 38525 BIOPSY/REMOVAL LYMPH NODES: CPT | Performed by: SURGERY

## 2025-05-12 PROCEDURE — 2500000003 HC RX 250 WO HCPCS: Performed by: SURGERY

## 2025-05-12 PROCEDURE — 88341 IMHCHEM/IMCYTCHM EA ADD ANTB: CPT

## 2025-05-12 PROCEDURE — 3700000000 HC ANESTHESIA ATTENDED CARE: Performed by: SURGERY

## 2025-05-12 PROCEDURE — C1781 MESH (IMPLANTABLE): HCPCS | Performed by: SURGERY

## 2025-05-12 PROCEDURE — 6360000002 HC RX W HCPCS: Performed by: SURGERY

## 2025-05-12 PROCEDURE — 19303 MAST SIMPLE COMPLETE: CPT | Performed by: SURGERY

## 2025-05-12 PROCEDURE — 88342 IMHCHEM/IMCYTCHM 1ST ANTB: CPT

## 2025-05-12 PROCEDURE — A9541 TC99M SULFUR COLLOID: HCPCS | Performed by: SURGERY

## 2025-05-12 PROCEDURE — 2580000003 HC RX 258: Performed by: SURGERY

## 2025-05-12 PROCEDURE — C1789 PROSTHESIS, BREAST, IMP: HCPCS | Performed by: SURGERY

## 2025-05-12 PROCEDURE — 38900 IO MAP OF SENT LYMPH NODE: CPT | Performed by: SURGERY

## 2025-05-12 PROCEDURE — 6360000002 HC RX W HCPCS: Performed by: ANESTHESIOLOGY

## 2025-05-12 PROCEDURE — 88360 TUMOR IMMUNOHISTOCHEM/MANUAL: CPT

## 2025-05-12 PROCEDURE — 6360000002 HC RX W HCPCS

## 2025-05-12 PROCEDURE — 3430000000 HC RX DIAGNOSTIC RADIOPHARMACEUTICAL: Performed by: SURGERY

## 2025-05-12 PROCEDURE — 3600000004 HC SURGERY LEVEL 4 BASE: Performed by: SURGERY

## 2025-05-12 PROCEDURE — 7100000000 HC PACU RECOVERY - FIRST 15 MIN: Performed by: SURGERY

## 2025-05-12 PROCEDURE — 7100000001 HC PACU RECOVERY - ADDTL 15 MIN: Performed by: SURGERY

## 2025-05-12 PROCEDURE — 2500000003 HC RX 250 WO HCPCS: Performed by: NURSE ANESTHETIST, CERTIFIED REGISTERED

## 2025-05-12 PROCEDURE — 2709999900 HC NON-CHARGEABLE SUPPLY: Performed by: SURGERY

## 2025-05-12 PROCEDURE — 3600000014 HC SURGERY LEVEL 4 ADDTL 15MIN: Performed by: SURGERY

## 2025-05-12 PROCEDURE — 88307 TISSUE EXAM BY PATHOLOGIST: CPT

## 2025-05-12 PROCEDURE — 38792 RA TRACER ID OF SENTINL NODE: CPT

## 2025-05-12 DEVICE — MESH SURG SZ 20 X 25 IN POLYDIOXANONE MACROPOROUS MONOFILAME: Type: IMPLANTABLE DEVICE | Status: FUNCTIONAL

## 2025-05-12 RX ORDER — ONDANSETRON 4 MG/1
4 TABLET, ORALLY DISINTEGRATING ORAL EVERY 8 HOURS PRN
Status: DISCONTINUED | OUTPATIENT
Start: 2025-05-12 | End: 2025-05-13 | Stop reason: HOSPADM

## 2025-05-12 RX ORDER — NICOTINE 21 MG/24HR
1 PATCH, TRANSDERMAL 24 HOURS TRANSDERMAL DAILY
Status: DISCONTINUED | OUTPATIENT
Start: 2025-05-12 | End: 2025-05-13 | Stop reason: HOSPADM

## 2025-05-12 RX ORDER — SODIUM CHLORIDE 0.9 % (FLUSH) 0.9 %
5-40 SYRINGE (ML) INJECTION EVERY 12 HOURS SCHEDULED
Status: DISCONTINUED | OUTPATIENT
Start: 2025-05-12 | End: 2025-05-13 | Stop reason: HOSPADM

## 2025-05-12 RX ORDER — IBUPROFEN 400 MG/1
400 TABLET, FILM COATED ORAL EVERY 12 HOURS PRN
Status: DISCONTINUED | OUTPATIENT
Start: 2025-05-12 | End: 2025-05-13 | Stop reason: HOSPADM

## 2025-05-12 RX ORDER — DEXAMETHASONE SODIUM PHOSPHATE 4 MG/ML
INJECTION, SOLUTION INTRA-ARTICULAR; INTRALESIONAL; INTRAMUSCULAR; INTRAVENOUS; SOFT TISSUE
Status: DISCONTINUED | OUTPATIENT
Start: 2025-05-12 | End: 2025-05-12 | Stop reason: SDUPTHER

## 2025-05-12 RX ORDER — EPHEDRINE SULFATE/0.9% NACL/PF 25 MG/5 ML
SYRINGE (ML) INTRAVENOUS
Status: DISCONTINUED | OUTPATIENT
Start: 2025-05-12 | End: 2025-05-12 | Stop reason: SDUPTHER

## 2025-05-12 RX ORDER — SODIUM CHLORIDE 9 MG/ML
INJECTION, SOLUTION INTRAVENOUS CONTINUOUS
Status: DISCONTINUED | OUTPATIENT
Start: 2025-05-12 | End: 2025-05-12 | Stop reason: HOSPADM

## 2025-05-12 RX ORDER — SODIUM CHLORIDE 0.9 % (FLUSH) 0.9 %
5-40 SYRINGE (ML) INJECTION PRN
Status: DISCONTINUED | OUTPATIENT
Start: 2025-05-12 | End: 2025-05-12 | Stop reason: HOSPADM

## 2025-05-12 RX ORDER — ENOXAPARIN SODIUM 100 MG/ML
40 INJECTION SUBCUTANEOUS DAILY
Status: DISCONTINUED | OUTPATIENT
Start: 2025-05-13 | End: 2025-05-13 | Stop reason: HOSPADM

## 2025-05-12 RX ORDER — GABAPENTIN 300 MG/1
300 CAPSULE ORAL ONCE
Status: COMPLETED | OUTPATIENT
Start: 2025-05-12 | End: 2025-05-12

## 2025-05-12 RX ORDER — MIDAZOLAM HYDROCHLORIDE 1 MG/ML
INJECTION, SOLUTION INTRAMUSCULAR; INTRAVENOUS
Status: DISCONTINUED | OUTPATIENT
Start: 2025-05-12 | End: 2025-05-12 | Stop reason: SDUPTHER

## 2025-05-12 RX ORDER — SODIUM CHLORIDE 9 MG/ML
INJECTION, SOLUTION INTRAVENOUS PRN
Status: DISCONTINUED | OUTPATIENT
Start: 2025-05-12 | End: 2025-05-12 | Stop reason: HOSPADM

## 2025-05-12 RX ORDER — SODIUM CHLORIDE 0.9 % (FLUSH) 0.9 %
5-40 SYRINGE (ML) INJECTION PRN
Status: DISCONTINUED | OUTPATIENT
Start: 2025-05-12 | End: 2025-05-13 | Stop reason: HOSPADM

## 2025-05-12 RX ORDER — DEXAMETHASONE SODIUM PHOSPHATE 10 MG/ML
8 INJECTION, SOLUTION INTRAMUSCULAR; INTRAVENOUS ONCE
Status: COMPLETED | OUTPATIENT
Start: 2025-05-12 | End: 2025-05-12

## 2025-05-12 RX ORDER — LIDOCAINE HYDROCHLORIDE 20 MG/ML
INJECTION, SOLUTION INTRAVENOUS
Status: DISCONTINUED | OUTPATIENT
Start: 2025-05-12 | End: 2025-05-12 | Stop reason: SDUPTHER

## 2025-05-12 RX ORDER — NALOXONE HYDROCHLORIDE 0.4 MG/ML
INJECTION, SOLUTION INTRAMUSCULAR; INTRAVENOUS; SUBCUTANEOUS PRN
Status: DISCONTINUED | OUTPATIENT
Start: 2025-05-12 | End: 2025-05-12 | Stop reason: HOSPADM

## 2025-05-12 RX ORDER — ROCURONIUM BROMIDE 10 MG/ML
INJECTION, SOLUTION INTRAVENOUS
Status: DISCONTINUED | OUTPATIENT
Start: 2025-05-12 | End: 2025-05-12 | Stop reason: SDUPTHER

## 2025-05-12 RX ORDER — SODIUM CHLORIDE 0.9 % (FLUSH) 0.9 %
5-40 SYRINGE (ML) INJECTION EVERY 12 HOURS SCHEDULED
Status: DISCONTINUED | OUTPATIENT
Start: 2025-05-12 | End: 2025-05-12 | Stop reason: HOSPADM

## 2025-05-12 RX ORDER — OXYCODONE HYDROCHLORIDE 5 MG/1
5 TABLET ORAL EVERY 4 HOURS PRN
Status: DISCONTINUED | OUTPATIENT
Start: 2025-05-12 | End: 2025-05-13 | Stop reason: HOSPADM

## 2025-05-12 RX ORDER — POLYETHYLENE GLYCOL 3350 17 G/17G
17 POWDER, FOR SOLUTION ORAL DAILY PRN
Status: DISCONTINUED | OUTPATIENT
Start: 2025-05-12 | End: 2025-05-13 | Stop reason: HOSPADM

## 2025-05-12 RX ORDER — OXYCODONE HYDROCHLORIDE 5 MG/1
10 TABLET ORAL EVERY 4 HOURS PRN
Status: DISCONTINUED | OUTPATIENT
Start: 2025-05-12 | End: 2025-05-13 | Stop reason: HOSPADM

## 2025-05-12 RX ORDER — FENTANYL CITRATE 50 UG/ML
INJECTION, SOLUTION INTRAMUSCULAR; INTRAVENOUS
Status: COMPLETED
Start: 2025-05-12 | End: 2025-05-12

## 2025-05-12 RX ORDER — ONDANSETRON 2 MG/ML
INJECTION INTRAMUSCULAR; INTRAVENOUS
Status: DISCONTINUED | OUTPATIENT
Start: 2025-05-12 | End: 2025-05-12 | Stop reason: SDUPTHER

## 2025-05-12 RX ORDER — IBUPROFEN 400 MG/1
400 TABLET, FILM COATED ORAL ONCE
Status: COMPLETED | OUTPATIENT
Start: 2025-05-12 | End: 2025-05-12

## 2025-05-12 RX ORDER — SODIUM CHLORIDE, SODIUM LACTATE, POTASSIUM CHLORIDE, CALCIUM CHLORIDE 600; 310; 30; 20 MG/100ML; MG/100ML; MG/100ML; MG/100ML
INJECTION, SOLUTION INTRAVENOUS CONTINUOUS
Status: DISCONTINUED | OUTPATIENT
Start: 2025-05-12 | End: 2025-05-13 | Stop reason: HOSPADM

## 2025-05-12 RX ORDER — CEPHALEXIN 500 MG/1
500 CAPSULE ORAL 3 TIMES DAILY
Qty: 42 CAPSULE | Refills: 0 | Status: SHIPPED | OUTPATIENT
Start: 2025-05-12 | End: 2025-05-26

## 2025-05-12 RX ORDER — FENTANYL CITRATE 50 UG/ML
50 INJECTION, SOLUTION INTRAMUSCULAR; INTRAVENOUS EVERY 5 MIN PRN
Status: DISCONTINUED | OUTPATIENT
Start: 2025-05-12 | End: 2025-05-12 | Stop reason: HOSPADM

## 2025-05-12 RX ORDER — ONDANSETRON 2 MG/ML
4 INJECTION INTRAMUSCULAR; INTRAVENOUS EVERY 6 HOURS PRN
Status: DISCONTINUED | OUTPATIENT
Start: 2025-05-12 | End: 2025-05-13 | Stop reason: HOSPADM

## 2025-05-12 RX ORDER — GLYCOPYRROLATE 0.2 MG/ML
INJECTION INTRAMUSCULAR; INTRAVENOUS
Status: DISCONTINUED | OUTPATIENT
Start: 2025-05-12 | End: 2025-05-12 | Stop reason: SDUPTHER

## 2025-05-12 RX ORDER — SODIUM CHLORIDE 9 MG/ML
INJECTION, SOLUTION INTRAVENOUS PRN
Status: DISCONTINUED | OUTPATIENT
Start: 2025-05-12 | End: 2025-05-13 | Stop reason: HOSPADM

## 2025-05-12 RX ORDER — PROPOFOL 10 MG/ML
INJECTION, EMULSION INTRAVENOUS
Status: DISCONTINUED | OUTPATIENT
Start: 2025-05-12 | End: 2025-05-12 | Stop reason: SDUPTHER

## 2025-05-12 RX ORDER — FENTANYL CITRATE 50 UG/ML
INJECTION, SOLUTION INTRAMUSCULAR; INTRAVENOUS
Status: DISCONTINUED | OUTPATIENT
Start: 2025-05-12 | End: 2025-05-12 | Stop reason: SDUPTHER

## 2025-05-12 RX ORDER — ACETAMINOPHEN 500 MG
1000 TABLET ORAL ONCE
Status: COMPLETED | OUTPATIENT
Start: 2025-05-12 | End: 2025-05-12

## 2025-05-12 RX ADMIN — IBUPROFEN 400 MG: 400 TABLET, FILM COATED ORAL at 15:06

## 2025-05-12 RX ADMIN — ONDANSETRON 4 MG: 2 INJECTION, SOLUTION INTRAMUSCULAR; INTRAVENOUS at 08:05

## 2025-05-12 RX ADMIN — SODIUM CHLORIDE: 0.9 INJECTION, SOLUTION INTRAVENOUS at 07:03

## 2025-05-12 RX ADMIN — FENTANYL CITRATE 50 MCG: 50 INJECTION INTRAMUSCULAR; INTRAVENOUS at 10:47

## 2025-05-12 RX ADMIN — WATER 2000 MG: 1 INJECTION INTRAMUSCULAR; INTRAVENOUS; SUBCUTANEOUS at 16:24

## 2025-05-12 RX ADMIN — OXYCODONE 10 MG: 5 TABLET ORAL at 17:34

## 2025-05-12 RX ADMIN — MIDAZOLAM 2 MG: 1 INJECTION INTRAMUSCULAR; INTRAVENOUS at 07:47

## 2025-05-12 RX ADMIN — GLYCOPYRROLATE 0.2 MG: 0.2 INJECTION INTRAMUSCULAR; INTRAVENOUS at 09:17

## 2025-05-12 RX ADMIN — WATER 2000 MG: 1 INJECTION INTRAMUSCULAR; INTRAVENOUS; SUBCUTANEOUS at 23:58

## 2025-05-12 RX ADMIN — EPHEDRINE SULFATE 12.5 MG: 5 INJECTION INTRAVENOUS at 10:26

## 2025-05-12 RX ADMIN — FENTANYL CITRATE 50 MCG: 50 INJECTION INTRAMUSCULAR; INTRAVENOUS at 07:48

## 2025-05-12 RX ADMIN — DEXAMETHASONE SODIUM PHOSPHATE 8 MG: 10 INJECTION, SOLUTION INTRAMUSCULAR; INTRAVENOUS at 07:04

## 2025-05-12 RX ADMIN — SODIUM CHLORIDE, SODIUM LACTATE, POTASSIUM CHLORIDE, AND CALCIUM CHLORIDE: .6; .31; .03; .02 INJECTION, SOLUTION INTRAVENOUS at 13:14

## 2025-05-12 RX ADMIN — IBUPROFEN 400 MG: 400 TABLET, FILM COATED ORAL at 07:04

## 2025-05-12 RX ADMIN — FENTANYL CITRATE 50 MCG: 50 INJECTION INTRAMUSCULAR; INTRAVENOUS at 11:35

## 2025-05-12 RX ADMIN — Medication 80 MG: at 07:50

## 2025-05-12 RX ADMIN — ROCURONIUM BROMIDE 50 MG: 10 INJECTION, SOLUTION INTRAVENOUS at 07:51

## 2025-05-12 RX ADMIN — FENTANYL CITRATE 50 MCG: 50 INJECTION INTRAMUSCULAR; INTRAVENOUS at 11:11

## 2025-05-12 RX ADMIN — HYDROMORPHONE HYDROCHLORIDE 0.5 MG: 1 INJECTION, SOLUTION INTRAMUSCULAR; INTRAVENOUS; SUBCUTANEOUS at 11:25

## 2025-05-12 RX ADMIN — FENTANYL CITRATE 50 MCG: 50 INJECTION, SOLUTION INTRAMUSCULAR; INTRAVENOUS at 11:35

## 2025-05-12 RX ADMIN — OXYCODONE 5 MG: 5 TABLET ORAL at 13:11

## 2025-05-12 RX ADMIN — GABAPENTIN 300 MG: 300 CAPSULE ORAL at 07:04

## 2025-05-12 RX ADMIN — EPHEDRINE SULFATE 5 MG: 5 INJECTION INTRAVENOUS at 08:29

## 2025-05-12 RX ADMIN — FENTANYL CITRATE 50 MCG: 50 INJECTION INTRAMUSCULAR; INTRAVENOUS at 08:26

## 2025-05-12 RX ADMIN — HYDROMORPHONE HYDROCHLORIDE 0.5 MG: 1 INJECTION, SOLUTION INTRAMUSCULAR; INTRAVENOUS; SUBCUTANEOUS at 11:30

## 2025-05-12 RX ADMIN — WATER 2000 MG: 1 INJECTION INTRAMUSCULAR; INTRAVENOUS; SUBCUTANEOUS at 07:58

## 2025-05-12 RX ADMIN — GLYCOPYRROLATE 0.2 MG: 0.2 INJECTION INTRAMUSCULAR; INTRAVENOUS at 10:04

## 2025-05-12 RX ADMIN — EPHEDRINE SULFATE 7.5 MG: 5 INJECTION INTRAVENOUS at 08:45

## 2025-05-12 RX ADMIN — DEXAMETHASONE SODIUM PHOSPHATE 10 MG: 4 INJECTION, SOLUTION INTRAMUSCULAR; INTRAVENOUS at 08:05

## 2025-05-12 RX ADMIN — FENTANYL CITRATE 50 MCG: 50 INJECTION INTRAMUSCULAR; INTRAVENOUS at 11:15

## 2025-05-12 RX ADMIN — OXYCODONE 10 MG: 5 TABLET ORAL at 21:56

## 2025-05-12 RX ADMIN — SUGAMMADEX 200 MG: 100 INJECTION, SOLUTION INTRAVENOUS at 10:47

## 2025-05-12 RX ADMIN — PROPOFOL 150 MG: 10 INJECTION, EMULSION INTRAVENOUS at 07:51

## 2025-05-12 RX ADMIN — Medication 1 MILLICURIE: at 06:10

## 2025-05-12 RX ADMIN — ACETAMINOPHEN 1000 MG: 500 TABLET ORAL at 07:04

## 2025-05-12 ASSESSMENT — PAIN DESCRIPTION - DESCRIPTORS
DESCRIPTORS: ACHING;SORE;SHARP
DESCRIPTORS: ACHING;SORE
DESCRIPTORS: ACHING;SORE
DESCRIPTORS: SORE

## 2025-05-12 ASSESSMENT — PAIN SCALES - GENERAL
PAINLEVEL_OUTOF10: 7
PAINLEVEL_OUTOF10: 4
PAINLEVEL_OUTOF10: 4
PAINLEVEL_OUTOF10: 6
PAINLEVEL_OUTOF10: 4
PAINLEVEL_OUTOF10: 4
PAINLEVEL_OUTOF10: 5
PAINLEVEL_OUTOF10: 6
PAINLEVEL_OUTOF10: 5
PAINLEVEL_OUTOF10: 4
PAINLEVEL_OUTOF10: 7
PAINLEVEL_OUTOF10: 7
PAINLEVEL_OUTOF10: 4
PAINLEVEL_OUTOF10: 4
PAINLEVEL_OUTOF10: 7
PAINLEVEL_OUTOF10: 5
PAINLEVEL_OUTOF10: 6
PAINLEVEL_OUTOF10: 4
PAINLEVEL_OUTOF10: 5
PAINLEVEL_OUTOF10: 4
PAINLEVEL_OUTOF10: 5
PAINLEVEL_OUTOF10: 7
PAINLEVEL_OUTOF10: 4
PAINLEVEL_OUTOF10: 4

## 2025-05-12 ASSESSMENT — PAIN DESCRIPTION - ORIENTATION
ORIENTATION: RIGHT;LEFT

## 2025-05-12 ASSESSMENT — PAIN SCALES - WONG BAKER
WONGBAKER_NUMERICALRESPONSE: HURTS LITTLE MORE
WONGBAKER_NUMERICALRESPONSE: HURTS LITTLE MORE
WONGBAKER_NUMERICALRESPONSE: HURTS EVEN MORE
WONGBAKER_NUMERICALRESPONSE: HURTS LITTLE MORE
WONGBAKER_NUMERICALRESPONSE: HURTS EVEN MORE

## 2025-05-12 ASSESSMENT — PAIN DESCRIPTION - FREQUENCY
FREQUENCY: CONTINUOUS
FREQUENCY: CONTINUOUS

## 2025-05-12 ASSESSMENT — PAIN DESCRIPTION - ONSET
ONSET: ON-GOING
ONSET: ON-GOING

## 2025-05-12 ASSESSMENT — PAIN - FUNCTIONAL ASSESSMENT
PAIN_FUNCTIONAL_ASSESSMENT: ACTIVITIES ARE NOT PREVENTED
PAIN_FUNCTIONAL_ASSESSMENT: ACTIVITIES ARE NOT PREVENTED
PAIN_FUNCTIONAL_ASSESSMENT: 0-10
PAIN_FUNCTIONAL_ASSESSMENT: ACTIVITIES ARE NOT PREVENTED

## 2025-05-12 ASSESSMENT — PAIN DESCRIPTION - PAIN TYPE
TYPE: SURGICAL PAIN
TYPE: SURGICAL PAIN

## 2025-05-12 ASSESSMENT — PAIN DESCRIPTION - LOCATION
LOCATION: BREAST
LOCATION: ABDOMEN;BREAST
LOCATION: BREAST
LOCATION: BREAST

## 2025-05-12 NOTE — H&P
H and P for Surgery           [facility]  History and Physical Update    Pt Name: Capri Esteban  MRN: [id number]  YOB: 1955  Date of evaluation: 5/12/2025    [x] I have examined the patient and reviewed the H&P/Consult and there are no changes to the patient or plans.    [] I have examined the patient and reviewed the H&P/Consult and have noted the following changes:        Navin Cota MD  Electronically signed 5/12/2025 at 6:25 AM

## 2025-05-12 NOTE — PROGRESS NOTES
1112 Patient arrived to PACU. Patient arouses to voice and follows commands. Breast dressings CDI with no drainage present. Surgical bra in place. PETER drains x2 CDI and compressed to bulb suction. Ice packs applied. Patient denies nausea. Patient states pain 5/10 and tolerable at this time. Respirations even and unlabored. VSS.    1125 Patient complains of pain 7/10. Patient medicated with 0.5 mg of Dilaudid at this time.    1130 Patient complains of pain 7/10. Patient medicated with 0.5 mg of Dilaudid at this time.    1135 Patient complains of pain 7/10. Patient medicated with 50 mcg of Fentanyl at this time.    1145 Patient resting in bed with eyes closed, but arouses to voice. Patient states pain now 4/10 and tolerable at this time. Patient denies nausea. Respirations even and unlabored. VSS.    1150 Report called to Libby 5K LAURA.    1155 Patient meets criteria to discharge from PACU at this time.    1200 RN called and updated patient's , David, at this time.    1230 Patient transported to [facility] in stable condition with all belongings.

## 2025-05-12 NOTE — PROGRESS NOTES
Patient oriented to Same Day department and admitted to Same Day Surgery room 16.   Patient verbalized approval for first name, last initial with physician name on unit whiteboard.     Plan of care reviewed with patient.   Patient room whiteboard filled out and discussed with patient and responsible adult.   Patient and responsible adult offered Same Day Welcome Packet to review.    Call light in reach.   Bed in lowest position, locked, with one bed rail up.   SCDs and warming blanket in place.  Appropriate arm bands on patient.   Bathroom offered.   All questions and concerns of patient addressed.        Meds to Beds:   Patient informed of [facility] Meds to Beds program during admission. Patient is agreeable to program.   Contact information for the pharmacy and the Meds to Beds program:   Name: David   Relationship to patient:spouse/significant other   Phone number: [phone]

## 2025-05-12 NOTE — ANESTHESIA POSTPROCEDURE EVALUATION
Department of Anesthesiology  Postprocedure Note    Patient: Capri Estebna  MRN: [id number]  YOB: 1955  Date of evaluation: 5/12/2025    Procedure Summary       Date: 05/12/25 [facility]    Anesthesia Start: 0745 Anesthesia Stop: 1119    Procedures:       Left breast simple mastectomy with sentinel lymph node biopsy & RIGHT SIDE PROPHYLATIC BREAST MASTECTOMY (Bilateral: Breast)      BILATERAL Removal of Intact Breast Implant,BILATER Capsulectomy,Left Breast Reconstruction with Tissue Explander Placement,Poss Implantation of Biologic Mesh (Bilateral) Diagnosis:       Ductal carcinoma in situ of left breast      (Ductal carcinoma in situ of left breast [D05.12])    Surgeons: Navin Cota MD; Ian Gonzalez MD Responsible Provider: Alejo Kerr DO    Anesthesia Type: general ASA Status: 2            Anesthesia Type: No value filed.    Twyla Phase I: Twyla Score: 8    Twyla Phase II:      Anesthesia Post Evaluation    Patient location during evaluation: bedside  Patient participation: complete - patient participated  Level of consciousness: awake  Airway patency: patent  Nausea & Vomiting: no nausea and no vomiting  Cardiovascular status: hemodynamically stable  Respiratory status: acceptable  Hydration status: stable  Pain management: adequate    No notable events documented.

## 2025-05-12 NOTE — OP NOTE
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        Procedure(s):  Bilateral Breast Reconstruction with Tissue Explander Placement and Implantation of Bioprosthetic Mesh    Surgeon(s):  Ian Gonzalez MD    Assistant:   LACEY Barnes assisted throughout the entire procedure including setup with prepping, postioning, and draping; during the procedures above by retracting and exposing while I performed the surgical procedures; and finally with wound closure under my direct supervision.     Anesthesia: General    Estimated Blood Loss (mL): less than 50     Complications: None    Specimens:   ID Type Source Tests Collected by Time Destination   A : right breast Tissue Breast SURGICAL PATHOLOGY Navin Cota MD 5/12/2025 0839    B : left breast st 571   wih implant in tissue Tissue Breast SURGICAL PATHOLOGY Navin Cota MD 5/12/2025 0912    C : senttinel node left   reading 171 Tissue Lymph Node SURGICAL PATHOLOGY Navin Cota MD 5/12/2025 0931        Implants:  Implant Name Type Inv. Item Serial No.  Lot No. LRB No. Used Action   MESH SURG SZ 20 X 25 IN POLYDIOXANONE MACROPOROUS MONOFILAME - [id number]  MESH SURG SZ 20 X 25 IN POLYDIOXANONE MACROPOROUS MONOFILAME [id number] [employer] WONG-WD [id number] Right 1 Implanted   MESH SURG SZ 20 X 25 IN POLYDIOXANONE MACROPOROUS MONOFILAME - [id number]  MESH SURG SZ 20 X 25 IN POLYDIOXANONE MACROPOROUS MONOFILAME [id number] [employer] W[employer]WD [id number] Left 1 Implanted         Drains:   Closed/Suction Drain Lateral;Right Breast Other (Comment) (Active)       Closed/Suction Drain Lateral;Left Other (Comment) (Active)       Findings:  Infection Present At Time Of Surgery (PATOS) (choose all  approximated with several layers of absorbable suture.  Sterile dressing was applied.  The drain was placed to self suction.    At the completion of the case, I felt that the mastectomy flaps on both sides had some ecchymotic appearance with cap refill that was not brisk. Mastectomy flaps were noted to be thin on both sides.     At the completion of the operation the patient was awakened from anesthesia and transferred to the PACU in stable condition.  There are no complications.  I was present for and participated in the entire operation.     Tissue expander final fill volume was:   300 cc on the LEFT side  300 cc on the RIGHT side     Electronically signed by Ian Gonzalez MD on 5/12/2025 at 10:47 AM

## 2025-05-12 NOTE — OP NOTE
[facility]  Operative Report    PATIENT NAME: Capri Esteban  MEDICAL RECORD NO. [id number]  SURGEON: Navin Cota MD MD [facility]  Primary Care Physician: Navin Pedraza,   Date: 5/12/2025, 9:18 AM     PROCEDURE PERFORMED: 1 Left Simple Mastectomy and SLN Bx     2. Removal of implant  PREOPERATIVE DIAGNOSIS: Left  ductal carcinoma in-situ breast cancer  POSTOPERATIVE DIAGNOSIS: Same, path pending  SURGEON:  Navin Cota MD MD [facility]  ANESTHESIA:  General endotracheal anesthesia  ESTIMATED BLOOD LOSS:  50  ml  SPECIMEN:  Left Breast, implant, and 1  and sln(s)  COMPLICATIONS:  None; patient tolerated the procedure well.  DISPOSITION: PACU - hemodynamically stable.  CONDITION: stable  Stage:  Cancer Staging   Ductal carcinoma in situ (DCIS) of left breast  Staging form: Breast, AJCC 8th Edition  - Pathologic stage from 3/19/2025: Stage 0 (pTis (DCIS), cN0, cM0, ER+, MN+, HER2: Not Assessed) - Signed by Navin Cota MD on 3/19/2025     Findings: 1 sln(s) with 171 cps      Procedure Details   The patient was seen again in the Holding Area. The risks, benefits, indications, potential complications, treatment options, and expected outcomes had been previously discussed with the patient. The possibilities of reaction to medication, pulmonary aspiration, bleeding, recurrent infection, the need for additional procedures, failure to diagnose a condition, and creating a complication requiring transfusion or further operation were discussed with the patient. The patient and/or family concurred with the proposed plan, giving informed consent.  The site of surgery was properly noted/marked.     The patient was taken to the Operating Room, identified as Capri Esteban and the procedure verified as Left simple mastectomy and sentinel lymph node biopsy and removal of implant. A Time Out was held and the above information confirmed. Preoperative antibiotics were given, signed consent in the

## 2025-05-12 NOTE — INTERVAL H&P NOTE
Update History & Physical    The patient's History and Physical of May 11, 2025 was reviewed with the patient and I examined the patient. There was no change - we will plan for immediate reconstruction of bilateral breasts with bioprosthetic mesh and tissue expanders. The surgical site was confirmed by the patient and me.     Plan: The risks, benefits, expected outcome, and alternative to the recommended procedure have been discussed with the patient. Patient understands and wants to proceed with the procedure.     Electronically signed by Ian Gonzalez MD on 5/12/2025 at 7:32 AM

## 2025-05-12 NOTE — OP NOTE
[facility]  Operative Report    PATIENT NAME: Capri Esteban  MEDICAL RECORD NO. [id number]  SURGEON: Navin Cota MD MD FACS  Primary Care Physician: Navin Pedraza,   Date: 5/12/2025, 9:25 AM     PROCEDURE PERFORMED: Right Simple Mastectomy and removal of intact right breast implant  PREOPERATIVE DIAGNOSIS: Left  ductal carcinoma in-situ breast cancer and desire for prophylactic mastectomy on the right  POSTOPERATIVE DIAGNOSIS: Same, path pending  SURGEON:  Navin Cota MD MD FACS  ANESTHESIA:  General endotracheal anesthesia  ESTIMATED BLOOD LOSS:  40  ml  SPECIMEN:  Right Breast   COMPLICATIONS:  None; patient tolerated the procedure well.    DISPOSITION: PACU - hemodynamically stable.  CONDITION: stable        Procedure Details   The patient was seen again in the Holding Area. The risks, benefits, indications, potential complications, treatment options, and expected outcomes had been previously discussed with the patient. The possibilities of reaction to medication, pulmonary aspiration, bleeding, recurrent infection, the need for additional procedures, failure to diagnose a condition, and creating a complication requiring transfusion or further operation were discussed with the patient. The patient and/or family concurred with the proposed plan, giving informed consent.  The site of surgery was properly noted/marked.     The patient was taken to the Operating Room, identified as Capri Esteban and the procedure verified as Right simple mastectomy. A Time Out was held and the above information confirmed. Preoperative antibiotics were given, signed consent in the chart, sequential compression devices were in place, and general anesthesia was administered.   An elliptical incision and previously drawn up to encompass the nipple-areolar complex and minimal skin skin flaps were developed and superior to the clavicle inferior to the rectus attached medial to the costochondral

## 2025-05-12 NOTE — ANESTHESIA PRE PROCEDURE
Department of Anesthesiology  Preprocedure Note       Name:  Capri Esteban   Age:  69 y.o.  :  1955                                          MRN:  [id number]         Date:  2025      Surgeon: Surgeon(s):  Navin Cota MD Kocak, Ergun, MD    Procedure: Procedure(s):  Left breast simple mastectomy with sentinel lymph node biopsy & RIGHT SIDE PROPHYLATIC BREAST MASTECTOMY  BILATERAL Removal of Intact Breast Implant,BILATER Capsulectomy,Left Breast Reconstruction with Tissue Explander Placement,Poss Implantation of Biologic Mesh    Medications prior to admission:   Prior to Admission medications    Medication Sig Start Date End Date Taking? Authorizing Provider   pantoprazole (PROTONIX) 40 MG tablet Take 1 tablet by mouth daily 23  Yes Provider, MD Rosa       Current medications:    Current Facility-Administered Medications   Medication Dose Route Frequency Provider Last Rate Last Admin   • 0.9 % sodium chloride infusion   IntraVENous Continuous Trista Leslie PA-C       • sodium chloride flush 0.9 % injection 5-40 mL  5-40 mL IntraVENous 2 times per day Trista Leslie PA-C       • sodium chloride flush 0.9 % injection 5-40 mL  5-40 mL IntraVENous PRN Trista Leslie PA-C       • 0.9 % sodium chloride infusion   IntraVENous PRN Trista Leslie PA-C       • 0.9 % sodium chloride infusion   IntraVENous Continuous Navin Cota  mL/hr at 25 0703 New Bag at 25 0703   • sodium chloride flush 0.9 % injection 5-40 mL  5-40 mL IntraVENous 2 times per day Navin Cota MD       • sodium chloride flush 0.9 % injection 5-40 mL  5-40 mL IntraVENous PRN Navin Cota MD       • 0.9 % sodium chloride infusion   IntraVENous PRN Navin Cota MD       • ceFAZolin (ANCEF) 2,000 mg in sterile water 20 mL IV syringe  2,000 mg IntraVENous On Call to OR Navin Cota MD           Allergies:  No Known Allergies    Problem List:

## 2025-05-12 NOTE — OP NOTE
[facility]  Operative Report    PATIENT NAME: Capri Esteban  MEDICAL RECORD NO. [id number]  SURGEON: Navin Cota MD MD FACS  Primary Care Physician: Navin Pedraza,   Date: 5/12/2025, 6:25 AM     PROCEDURE PERFORMED: Left  [place] Lymph node Injection  PREOPERATIVE DIAGNOSIS: Left Breast Cancer  POSTOPERATIVE DIAGNOSIS: Same, path pending  SURGEON:  Navin Cota MD MD FACS  ANESTHESIA:  None  ESTIMATED BLOOD LOSS:  None  COMPLICATIONS:  None; patient tolerated the procedure well.  CONDITION: stable          Procedure Details     The patient was seen in Nuclear Medicine in radiology. The risks, benefits, complications, treatment options, and expected outcomes were previously discussed with the patient. The possibilities of reaction to medication were discussed with the patient. The patient concurred with the proposed plan, giving informed consent.  The site of surgery properly noted/marked.   The patient was taken to the nuclear medicine room and identified  and the procedure verified as [place] Lymphnode injection . A Time Out was held and the above information confirmed.  The left breast was identified and the site for injection periareaolarly was identified. The breast was prepped with 1% alcohol. 0.5 cc 's of unfiltered technetium sulfur colloid was injected into the dermis of the skin at the site. A sterile dressing was placed over the injection site. The patient tolerated the procedure well.             Navin Cota MD, MD FACS  Electronically signed 5/12/2025 at 6:25 AM

## 2025-05-13 VITALS
SYSTOLIC BLOOD PRESSURE: 127 MMHG | HEIGHT: 63 IN | WEIGHT: 151.6 LBS | OXYGEN SATURATION: 95 % | HEART RATE: 64 BPM | RESPIRATION RATE: 18 BRPM | TEMPERATURE: 98.7 F | DIASTOLIC BLOOD PRESSURE: 55 MMHG | BODY MASS INDEX: 26.86 KG/M2

## 2025-05-13 LAB
BASOPHILS ABSOLUTE: 0 THOU/MM3 (ref 0–0.1)
BASOPHILS NFR BLD AUTO: 0.1 %
DEPRECATED RDW RBC AUTO: 45.4 FL (ref 35–45)
EOSINOPHIL NFR BLD AUTO: 0 %
EOSINOPHILS ABSOLUTE: 0 THOU/MM3 (ref 0–0.4)
ERYTHROCYTE [DISTWIDTH] IN BLOOD BY AUTOMATED COUNT: 12.3 % (ref 11.5–14.5)
HCT VFR BLD AUTO: 43 % (ref 37–47)
HGB BLD-MCNC: 13.1 GM/DL (ref 12–16)
IMM GRANULOCYTES # BLD AUTO: 0.06 THOU/MM3 (ref 0–0.07)
IMM GRANULOCYTES NFR BLD AUTO: 0.4 %
LYMPHOCYTES ABSOLUTE: 1.8 THOU/MM3 (ref 1–4.8)
LYMPHOCYTES NFR BLD AUTO: 13 %
MCH RBC QN AUTO: 30.4 PG (ref 26–33)
MCHC RBC AUTO-ENTMCNC: 30.5 GM/DL (ref 32.2–35.5)
MCV RBC AUTO: 99.8 FL (ref 81–99)
MONOCYTES ABSOLUTE: 1.4 THOU/MM3 (ref 0.4–1.3)
MONOCYTES NFR BLD AUTO: 10.1 %
NEUTROPHILS ABSOLUTE: 10.7 THOU/MM3 (ref 1.8–7.7)
NEUTROPHILS NFR BLD AUTO: 76.4 %
NRBC BLD AUTO-RTO: 0 /100 WBC
PLATELET # BLD AUTO: 255 THOU/MM3 (ref 130–400)
PMV BLD AUTO: 10.4 FL (ref 9.4–12.4)
RBC # BLD AUTO: 4.31 MILL/MM3 (ref 4.2–5.4)
WBC # BLD AUTO: 14 THOU/MM3 (ref 4.8–10.8)

## 2025-05-13 PROCEDURE — 6370000000 HC RX 637 (ALT 250 FOR IP): Performed by: SURGERY

## 2025-05-13 PROCEDURE — 99024 POSTOP FOLLOW-UP VISIT: CPT | Performed by: SURGERY

## 2025-05-13 PROCEDURE — 36415 COLL VENOUS BLD VENIPUNCTURE: CPT

## 2025-05-13 PROCEDURE — 85025 COMPLETE CBC W/AUTO DIFF WBC: CPT

## 2025-05-13 RX ORDER — OXYCODONE HYDROCHLORIDE 5 MG/1
5 TABLET ORAL EVERY 6 HOURS PRN
Qty: 20 TABLET | Refills: 0 | Status: SHIPPED | OUTPATIENT
Start: 2025-05-13 | End: 2025-05-18

## 2025-05-13 RX ORDER — CALCIUM CARBONATE 500 MG/1
500 TABLET, CHEWABLE ORAL 2 TIMES DAILY PRN
Status: DISCONTINUED | OUTPATIENT
Start: 2025-05-13 | End: 2025-05-13 | Stop reason: HOSPADM

## 2025-05-13 RX ADMIN — NITROGLYCERIN 1 INCH: 20 OINTMENT TOPICAL at 06:42

## 2025-05-13 RX ADMIN — OXYCODONE 10 MG: 5 TABLET ORAL at 07:54

## 2025-05-13 RX ADMIN — OXYCODONE 10 MG: 5 TABLET ORAL at 03:13

## 2025-05-13 ASSESSMENT — PAIN DESCRIPTION - DESCRIPTORS: DESCRIPTORS: ACHING

## 2025-05-13 ASSESSMENT — PAIN - FUNCTIONAL ASSESSMENT: PAIN_FUNCTIONAL_ASSESSMENT: ACTIVITIES ARE NOT PREVENTED

## 2025-05-13 ASSESSMENT — PAIN SCALES - GENERAL
PAINLEVEL_OUTOF10: 7
PAINLEVEL_OUTOF10: 4

## 2025-05-13 ASSESSMENT — PAIN DESCRIPTION - LOCATION: LOCATION: BREAST

## 2025-05-13 NOTE — PLAN OF CARE
Problem: Discharge Planning  Goal: Discharge to home or other facility with appropriate resources  Outcome: Progressing  Flowsheets (Taken 5/12/2025 1937)  Discharge to home or other facility with appropriate resources:   Identify barriers to discharge with patient and caregiver   Arrange for needed discharge resources and transportation as appropriate   Identify discharge learning needs (meds, wound care, etc)     Problem: Pain  Goal: Verbalizes/displays adequate comfort level or baseline comfort level  Outcome: Progressing  Flowsheets (Taken 5/12/2025 2224)  Verbalizes/displays adequate comfort level or baseline comfort level:   Encourage patient to monitor pain and request assistance   Administer analgesics based on type and severity of pain and evaluate response   Assess pain using appropriate pain scale   Implement non-pharmacological measures as appropriate and evaluate response     Problem: ABCDS Injury Assessment  Goal: Absence of physical injury  Outcome: Progressing  Flowsheets (Taken 5/12/2025 2224)  Absence of Physical Injury: Implement safety measures based on patient assessment     Problem: Safety - Adult  Goal: Free from fall injury  Outcome: Progressing  Flowsheets (Taken 5/12/2025 2224)  Free From Fall Injury:   Instruct family/caregiver on patient safety   Based on caregiver fall risk screen, instruct family/caregiver to ask for assistance with transferring infant if caregiver noted to have fall risk factors

## 2025-05-13 NOTE — PROGRESS NOTES
Patient discharged home with family. Educated on discharge instructions, follow ups and medications. No questions or concerns voiced.

## 2025-05-13 NOTE — PROGRESS NOTES
[facility] GENERAL SURGERY  General Surgery Postoperative Progress Note    Pt Name: Capri Esteban  Medical Record Number: [id number]  Date of Birth 1955   Today's Date: 2025    CC:No chief complaint on file.      ASSESSMENT   POD # 1 status post left simple mastectomy and sentinel lymph node biopsy with removal of implant and immediate breast reconstruction and right simple mastectomy and removal of implant with immediate breast reconstruction  HD # 0  PLAN   Try some low-dose nitroglycerin to the right breast upper flap will have them apply 500 mg to the bluish discolored skin on the upper half of the flap  Antibiotics been completed  Plan discharge today  She has follow-up with  on Tuesday already arranged  SUBJECTIVE   Capri is doing well. She denies any nausea or vomiting, said she did not really get much sleep last night She is tolerating a ADULT DIET; Regular. Her pain is well controlled on current medications. She has been ambulating in the halls.   CURRENT MEDICATIONS   Scheduled Meds:   sodium chloride flush  5-40 mL IntraVENous 2 times per day    nicotine  1 patch TransDERmal Daily    enoxaparin  40 mg SubCUTAneous Daily     Continuous Infusions:   lactated ringers 75 mL/hr at 25 1314    sodium chloride       PRN Meds:.calcium carbonate, sodium chloride flush, sodium chloride, oxyCODONE **OR** oxyCODONE, HYDROmorphone **OR** HYDROmorphone, polyethylene glycol, ibuprofen, ondansetron **OR** ondansetron  ROS:   History obtained from chart review and the patient, General ROS:   OBJECTIVE   CURRENT VITALS:  height is 1.6 m (5' 3\") and weight is 68.8 kg (151 lb 9.6 oz). Her oral temperature is 98 °F (36.7 °C). Her blood pressure is 135/62 and her pulse is 66. Her respiration is 18 and oxygen saturation is 91%.  Body mass index is 26.85 kg/m².  Temperature Range (24h):Temp: 98 °F (36.7 °C) Temp  Av °F (36.7 °C)  Min: 97.4 °F (36.3 °C)  Max: 98.7 °F (37.1

## 2025-05-13 NOTE — DISCHARGE INSTRUCTIONS
Apply nitro ointment to skin right breast above incison 3x/day    Keep track drain output    You have a closed suction drain in your operative wound to remove fluid and/or blood that would otherwise collect.  Care is as follows.    Every eight (8) hours, and as needed in between:    Open the stopper on the drain reservoir and allow it to completely inflate.    Measure the amount of drainage in the reservoir and write it down, along with the   date and time you did the measurement.    Empty the reservoir into the sink or toilet.    Squeeze the drain reservoir to remove the air inside, then replace the stopper so that when you let go of the reservoir, it wants to expand. It is then on suction.    Do not allow the drain to expand completely.  If it is completely expanded, it is NOT suctioning.

## 2025-05-15 ENCOUNTER — TELEPHONE (OUTPATIENT)
Dept: CASE MANAGEMENT | Age: 70
End: 2025-05-15

## 2025-05-15 DIAGNOSIS — Z17.0 MALIGNANT NEOPLASM OF UPPER-INNER QUADRANT OF LEFT BREAST IN FEMALE, ESTROGEN RECEPTOR POSITIVE (HCC): Primary | ICD-10-CM

## 2025-05-15 DIAGNOSIS — C50.212 MALIGNANT NEOPLASM OF UPPER-INNER QUADRANT OF LEFT BREAST IN FEMALE, ESTROGEN RECEPTOR POSITIVE (HCC): Primary | ICD-10-CM

## 2025-05-15 NOTE — TELEPHONE ENCOUNTER
Name: Capri Esteban  : 1955  MRN: [id number]    Oncology Navigation Follow-Up Note    Contact Type:  Telephone    Patient called yesterday and left voicemail at 430 pm. Returned call today. Patient wanted sister Katy on speaker during call also. Katy staying with patient to assist with post-op care and needs. Many questions and concerns. Had bilateral mastectomies with spacers placed , discharged .    Questions/Clarification Requested: \"Not sent home with any supplies. Needed measuring cup for drain output, has 2 drains total. Picked up urine specimen container from [facility] next day.\" Per sister \"had to go get gauze dressings also\". Had questions at discharge re: nitro cream \"nurse was pedi nurse and didn't know anything about it.\"  Patient called Dr. Gonzalez's office with questions. \"Told ok to shower with drains, alternate Tylenol and Ibuprofen for pain,\" Has oxycodone but hesitant to use. Dr Gonzalez's nurse asked about flexeril. Not given prescription at discharge so nurse called rx to [facility]'s but not available until 3 pm today.\" Sister and patient express \"feeling a disconnect between nursing, told conflicting things\" Patient concerned noone has looked at incisions since surgery-sees Carlos next week.       Notes:Myself and Savannah Stewart on call. Teaching completed on: surgical bra states \"always had history of rib pain, surgical bra causes worsening pain.\" Option given for acewrap under camisole she's been wearing from [facility]. drain care-output documenting, making sure PETER is compressed before sealing, and tube stripping, dressing care-reinforcing with gauze as needed, and pain management-been using Tylenol and Ibuprofen multiple times per day as instructed by Dr. Gonzalez's office. Per patient \"sometimes taking 2 hrs apart and still uncomfortable.\" Encouraged oxycodone for better pain control since taking over the counter medication that often. Discouraged ibuprofen as often due to

## 2025-05-16 PROBLEM — C50.212 CARCINOMA OF UPPER-INNER QUADRANT OF LEFT BREAST IN FEMALE, ESTROGEN RECEPTOR POSITIVE (HCC): Status: ACTIVE | Noted: 2025-05-16

## 2025-05-16 PROBLEM — Z17.0 CARCINOMA OF UPPER-INNER QUADRANT OF LEFT BREAST IN FEMALE, ESTROGEN RECEPTOR POSITIVE (HCC): Status: ACTIVE | Noted: 2025-05-16

## 2025-05-30 ENCOUNTER — OFFICE VISIT (OUTPATIENT)
Dept: ONCOLOGY | Age: 70
End: 2025-05-30
Payer: MEDICARE

## 2025-05-30 ENCOUNTER — HOSPITAL ENCOUNTER (OUTPATIENT)
Dept: INFUSION THERAPY | Age: 70
Discharge: HOME | End: 2025-05-30
Payer: MEDICARE

## 2025-05-30 VITALS
TEMPERATURE: 98.1 F | WEIGHT: 147 LBS | HEIGHT: 63 IN | HEART RATE: 85 BPM | RESPIRATION RATE: 18 BRPM | OXYGEN SATURATION: 95 % | BODY MASS INDEX: 26.05 KG/M2 | DIASTOLIC BLOOD PRESSURE: 71 MMHG | SYSTOLIC BLOOD PRESSURE: 140 MMHG

## 2025-05-30 VITALS
HEART RATE: 85 BPM | DIASTOLIC BLOOD PRESSURE: 71 MMHG | RESPIRATION RATE: 18 BRPM | OXYGEN SATURATION: 95 % | SYSTOLIC BLOOD PRESSURE: 140 MMHG | TEMPERATURE: 98.1 F | BODY MASS INDEX: 26.05 KG/M2 | WEIGHT: 147 LBS | HEIGHT: 63 IN

## 2025-05-30 DIAGNOSIS — Z17.0 MALIGNANT NEOPLASM OF LEFT BREAST IN FEMALE, ESTROGEN RECEPTOR POSITIVE, UNSPECIFIED SITE OF BREAST (HCC): ICD-10-CM

## 2025-05-30 DIAGNOSIS — C50.912 MALIGNANT NEOPLASM OF LEFT BREAST IN FEMALE, ESTROGEN RECEPTOR POSITIVE, UNSPECIFIED SITE OF BREAST (HCC): ICD-10-CM

## 2025-05-30 DIAGNOSIS — D05.12 DUCTAL CARCINOMA IN SITU (DCIS) OF LEFT BREAST: ICD-10-CM

## 2025-05-30 DIAGNOSIS — Z90.13 S/P BILATERAL MASTECTOMY: Primary | ICD-10-CM

## 2025-05-30 LAB
ALBUMIN SERPL BCG-MCNC: 4.3 G/DL (ref 3.4–4.9)
ALP SERPL-CCNC: 55 U/L (ref 38–126)
ALT SERPL W/O P-5'-P-CCNC: 16 U/L (ref 10–35)
AST SERPL-CCNC: 25 U/L (ref 10–35)
BASOPHILS ABSOLUTE: 0 THOU/MM3 (ref 0–0.1)
BASOPHILS NFR BLD AUTO: 1 % (ref 0–3)
BILIRUB CONJ SERPL-MCNC: 0.3 MG/DL (ref 0–0.2)
BILIRUB SERPL-MCNC: 0.7 MG/DL (ref 0.3–1.2)
BUN BLDP-MCNC: 23 MG/DL (ref 8–26)
CHLORIDE BLD-SCNC: 102 MEQ/L (ref 98–109)
CREAT BLD-MCNC: 0.8 MG/DL (ref 0.5–1.2)
EOSINOPHIL NFR BLD AUTO: 3 % (ref 0–4)
EOSINOPHILS ABSOLUTE: 0.2 THOU/MM3 (ref 0–0.4)
ERYTHROCYTE [DISTWIDTH] IN BLOOD BY AUTOMATED COUNT: 11.9 % (ref 11.5–14.5)
GFR SERPL CREATININE-BSD FRML MDRD: 79 ML/MIN/1.73M2
GLUCOSE BLD-MCNC: 126 MG/DL (ref 70–108)
HCT VFR BLD AUTO: 41.1 % (ref 37–47)
HGB BLD-MCNC: 13.4 GM/DL (ref 12–16)
IMMATURE GRANULOCYTES %: 0 %
IMMATURE GRANULOCYTES ABSOLUTE: 0.01 THOU/MM3 (ref 0–0.07)
IONIZED CALCIUM, WHOLE BLOOD: 1.21 MMOL/L (ref 1.12–1.32)
LYMPHOCYTES ABSOLUTE: 2 THOU/MM3 (ref 1–4.8)
LYMPHOCYTES NFR BLD AUTO: 27 % (ref 15–47)
MCH RBC QN AUTO: 30.4 PG (ref 26–33)
MCHC RBC AUTO-ENTMCNC: 32.6 GM/DL (ref 32.2–35.5)
MCV RBC AUTO: 93 FL (ref 81–99)
MONOCYTES ABSOLUTE: 0.8 THOU/MM3 (ref 0.4–1.3)
MONOCYTES NFR BLD AUTO: 11 % (ref 0–12)
NEUTROPHILS ABSOLUTE: 4.3 THOU/MM3 (ref 1.8–7.7)
NEUTROPHILS NFR BLD AUTO: 59 % (ref 43–75)
PLATELET # BLD AUTO: 362 THOU/MM3 (ref 130–400)
PMV BLD AUTO: 9.4 FL (ref 9.4–12.4)
POTASSIUM BLD-SCNC: 4 MEQ/L (ref 3.5–4.9)
PROT SERPL-MCNC: 7.1 G/DL (ref 6.4–8.3)
RBC # BLD AUTO: 4.41 MILL/MM3 (ref 4.2–5.4)
SODIUM BLD-SCNC: 139 MEQ/L (ref 138–146)
TOTAL CO2, WHOLE BLOOD: 31 MEQ/L (ref 23–33)
WBC # BLD AUTO: 7.3 THOU/MM3 (ref 4.8–10.8)

## 2025-05-30 PROCEDURE — 1126F AMNT PAIN NOTED NONE PRSNT: CPT | Performed by: INTERNAL MEDICINE

## 2025-05-30 PROCEDURE — 99214 OFFICE O/P EST MOD 30 MIN: CPT | Performed by: INTERNAL MEDICINE

## 2025-05-30 PROCEDURE — 3017F COLORECTAL CA SCREEN DOC REV: CPT | Performed by: INTERNAL MEDICINE

## 2025-05-30 PROCEDURE — 85025 COMPLETE CBC W/AUTO DIFF WBC: CPT

## 2025-05-30 PROCEDURE — G9899 SCRN MAM PERF RSLTS DOC: HCPCS | Performed by: INTERNAL MEDICINE

## 2025-05-30 PROCEDURE — 99211 OFF/OP EST MAY X REQ PHY/QHP: CPT

## 2025-05-30 PROCEDURE — G8419 CALC BMI OUT NRM PARAM NOF/U: HCPCS | Performed by: INTERNAL MEDICINE

## 2025-05-30 PROCEDURE — 1159F MED LIST DOCD IN RCRD: CPT | Performed by: INTERNAL MEDICINE

## 2025-05-30 PROCEDURE — 1123F ACP DISCUSS/DSCN MKR DOCD: CPT | Performed by: INTERNAL MEDICINE

## 2025-05-30 PROCEDURE — 1090F PRES/ABSN URINE INCON ASSESS: CPT | Performed by: INTERNAL MEDICINE

## 2025-05-30 PROCEDURE — 36415 COLL VENOUS BLD VENIPUNCTURE: CPT

## 2025-05-30 PROCEDURE — G8427 DOCREV CUR MEDS BY ELIG CLIN: HCPCS | Performed by: INTERNAL MEDICINE

## 2025-05-30 PROCEDURE — 1036F TOBACCO NON-USER: CPT | Performed by: INTERNAL MEDICINE

## 2025-05-30 PROCEDURE — 80047 BASIC METABLC PNL IONIZED CA: CPT

## 2025-05-30 PROCEDURE — G8399 PT W/DXA RESULTS DOCUMENT: HCPCS | Performed by: INTERNAL MEDICINE

## 2025-05-30 PROCEDURE — 80076 HEPATIC FUNCTION PANEL: CPT

## 2025-05-30 RX ORDER — ANASTROZOLE 1 MG/1
1 TABLET ORAL DAILY
Qty: 90 TABLET | Refills: 5 | Status: SHIPPED | OUTPATIENT
Start: 2025-05-30

## 2025-05-30 RX ORDER — CEPHALEXIN 500 MG/1
500 CAPSULE ORAL 4 TIMES DAILY
COMMUNITY

## 2025-05-30 NOTE — PATIENT INSTRUCTIONS
Please provide copies of oncotype and DEXA scan.  Orders Placed This Encounter   Medications    anastrozole (ARIMIDEX) 1 MG tablet     Sig: Take 1 tablet by mouth daily     Dispense:  90 tablet     Refill:  5     Orders Placed This Encounter   Procedures    CBC with Auto Differential    Hepatic Function Panel    POC PANEL BMP W/IOCA        Return in about 8 weeks (around 7/25/2025).    Initiated Calcium 1200 mg daily and vitamin D( 800- 1000 IU) daily.

## 2025-05-30 NOTE — PROGRESS NOTES
negative for DCIS    Distance from DCIS to Closest Margin    Specify in Millimeters (mm)    Exact distance: 12 mm to anterior margin     REGIONAL LYMPH NODES   Regional Lymph Node Status   Regional lymph nodes present    All regional lymph nodes negative for tumor    Total Number of Lymph Nodes Examined (sentinel and non-sentinel)    Exact number (specify): 1    Number of Garibaldi Nodes Examined       Exact number (specify): 1     Distant Site(s) Involved, if applicable    Not applicable     pTNM CLASSIFICATION (AJCC 8th Edition)   Modified Classification (required only if applicable) (select all that   apply)   Not applicable   pT Category   pT1c: Tumor greater than 10 mm but less than or equal to 20 mm in   greatest dimension   T Suffix    Not applicable   pN Category   pN0: No regional lymph node metastasis identified or ITCs only#   N Suffix (required only if applicable) (select all that apply)    (sn): [place]ibal[place] node(s) evaluated. If 6 or more nodes (sentinel or   nonsentinel) are removed, this          modifier should not be used   pM Category   Not applicable - pM cannot be determined from the submitted specimen(s)       ADDITIONAL FINDINGS   Prior biopsy site findings.  Deeper levels are performed on blocks C1   and C2 of the sentinel lymph node.     BREAST BIOMARKERS   Estrogen Receptor: (Clone SP1), [employer]       Positive 100% of cells (>10% of cells)     Intensity of Staining:       Strong     Progesterone Receptor: (Clone 1E2), [employer]       Positive 95 % of cells       Intensity of Staining:       Strong       Ki-67 (clone 30-9)       Percentage of positive nuclei: 15 %               Favorable <10%              Borderline 10-20%               Unfavorable >20%       HER2 Immunohistochemistry (Clone 4B5, [employer])       Negative (0) - No staining observed OR incomplete, faint/barely       perceptible membrane       staining in <10% of invasive tumor

## 2025-06-03 ENCOUNTER — APPOINTMENT (OUTPATIENT)
Dept: PHYSICAL THERAPY | Age: 70
End: 2025-06-03
Attending: INTERNAL MEDICINE
Payer: MEDICARE

## 2025-06-04 ENCOUNTER — HOSPITAL ENCOUNTER (OUTPATIENT)
Dept: PHYSICAL THERAPY | Age: 70
Setting detail: THERAPIES SERIES
Discharge: HOME | End: 2025-06-04
Attending: INTERNAL MEDICINE
Payer: MEDICARE

## 2025-06-04 PROCEDURE — 97110 THERAPEUTIC EXERCISES: CPT

## 2025-06-04 NOTE — PROGRESS NOTES
[facility]  ONCOLOGY REHABILITATION  PHYSICAL THERAPY  [x] PROGRESS NOTE    [x] SPECIALIZED THERAPY SERVICES - [place]     Date: 2025  Patient Name:  Capri Esteban  : 1955  MRN: [id number]  CSN: [id number]    Referring Practitioner Michelle Delgado MD [id number]      Diagnosis  Diagnoses       D05.12 (ICD-10-CM) - Ductal carcinoma in situ (DCIS) of left breast           Treatment Diagnosis I89.0 Lymphedema, not elsewhere classified  M25.512  Left Shoulder Pain  M25.612 Stiffness of Left Shoulder   Date of Evaluation 25   Additional Pertinent History Capri Esteban has a past medical history of Breast cancer (HCC), Ductal carcinoma in situ (DCIS) of left breast, GERD (gastroesophageal reflux disease), Hiatal hernia, History of basal cell carcinoma of skin, Hyperlipidemia, and Irritable bowel syndrome.  she has a past surgical history that includes  section (); Colonoscopy; Breast enhancement surgery (Bilateral, ); Breast enhancement surgery (Bilateral, 2020); US BREAST BIOPSY W LOC DEVICE EACH ADDL LESION LEFT (Left, 2025); PRATIMA US GUIDED BREAST BIOPSY W LOC DEVICE 1ST LESION LEFT (Left, 2025); Colonoscopy w/ polypectomy (2023); Esophagogastroduodenoscopy (2023); US PLACE BREAST LOC DEVICE 1ST LESION LEFT (Left, 3/6/2025); Breast surgery (Left, 3/6/2025); Mastectomy (Bilateral, 2025); and Breast reconstruction (Bilateral, 2025).     Allergies No Known Allergies   Medications   Current Outpatient Medications:     cephALEXin (KEFLEX) 500 MG capsule, Take 1 capsule by mouth 4 times daily, Disp: , Rfl:     anastrozole (ARIMIDEX) 1 MG tablet, Take 1 tablet by mouth daily, Disp: 90 tablet, Rfl: 5    nitroglycerin (NITRO-BID) 2 % ointment, Place 1 inch onto the skin every 8 hours for 21 doses Applied to the right breast bluish areas of skin on the upper half above the incision, Disp: 21 g, Rfl: 0    pantoprazole (PROTONIX)

## 2025-07-16 ENCOUNTER — APPOINTMENT (OUTPATIENT)
Dept: PHYSICAL THERAPY | Age: 70
End: 2025-07-16
Attending: INTERNAL MEDICINE
Payer: MEDICARE

## 2025-07-29 ENCOUNTER — APPOINTMENT (OUTPATIENT)
Dept: PHYSICAL THERAPY | Age: 70
End: 2025-07-29
Attending: INTERNAL MEDICINE
Payer: MEDICARE

## 2025-08-01 ENCOUNTER — HOSPITAL ENCOUNTER (OUTPATIENT)
Dept: INFUSION THERAPY | Age: 70
Discharge: HOME | End: 2025-08-01
Payer: MEDICARE

## 2025-08-01 ENCOUNTER — OFFICE VISIT (OUTPATIENT)
Dept: ONCOLOGY | Age: 70
End: 2025-08-01
Payer: MEDICARE

## 2025-08-01 VITALS
HEIGHT: 63 IN | DIASTOLIC BLOOD PRESSURE: 72 MMHG | WEIGHT: 152 LBS | OXYGEN SATURATION: 98 % | BODY MASS INDEX: 26.93 KG/M2 | HEART RATE: 86 BPM | RESPIRATION RATE: 18 BRPM | SYSTOLIC BLOOD PRESSURE: 118 MMHG | TEMPERATURE: 98.9 F

## 2025-08-01 VITALS
RESPIRATION RATE: 18 BRPM | DIASTOLIC BLOOD PRESSURE: 72 MMHG | HEART RATE: 86 BPM | SYSTOLIC BLOOD PRESSURE: 118 MMHG | TEMPERATURE: 98.9 F

## 2025-08-01 DIAGNOSIS — C50.912 MALIGNANT NEOPLASM OF LEFT BREAST IN FEMALE, ESTROGEN RECEPTOR POSITIVE, UNSPECIFIED SITE OF BREAST (HCC): ICD-10-CM

## 2025-08-01 DIAGNOSIS — Z90.13 S/P BILATERAL MASTECTOMY: ICD-10-CM

## 2025-08-01 DIAGNOSIS — Z17.0 MALIGNANT NEOPLASM OF LEFT BREAST IN FEMALE, ESTROGEN RECEPTOR POSITIVE, UNSPECIFIED SITE OF BREAST (HCC): ICD-10-CM

## 2025-08-01 DIAGNOSIS — Z17.0 MALIGNANT NEOPLASM OF LEFT BREAST IN FEMALE, ESTROGEN RECEPTOR POSITIVE, UNSPECIFIED SITE OF BREAST (HCC): Primary | ICD-10-CM

## 2025-08-01 DIAGNOSIS — C50.912 MALIGNANT NEOPLASM OF LEFT BREAST IN FEMALE, ESTROGEN RECEPTOR POSITIVE, UNSPECIFIED SITE OF BREAST (HCC): Primary | ICD-10-CM

## 2025-08-01 LAB
ALBUMIN SERPL BCG-MCNC: 4.2 G/DL (ref 3.4–4.9)
ALP SERPL-CCNC: 68 U/L (ref 38–126)
ALT SERPL W/O P-5'-P-CCNC: 17 U/L (ref 10–35)
AST SERPL-CCNC: 25 U/L (ref 10–35)
BASOPHILS ABSOLUTE: 0 THOU/MM3 (ref 0–0.1)
BASOPHILS NFR BLD AUTO: 1 % (ref 0–3)
BILIRUB CONJ SERPL-MCNC: 0.2 MG/DL (ref 0–0.2)
BILIRUB SERPL-MCNC: 0.7 MG/DL (ref 0.3–1.2)
BUN BLDP-MCNC: 23 MG/DL (ref 8–26)
CHLORIDE BLD-SCNC: 102 MEQ/L (ref 98–109)
CREAT BLD-MCNC: 0.8 MG/DL (ref 0.5–1.2)
EOSINOPHIL NFR BLD AUTO: 3 % (ref 0–4)
EOSINOPHILS ABSOLUTE: 0.2 THOU/MM3 (ref 0–0.4)
ERYTHROCYTE [DISTWIDTH] IN BLOOD BY AUTOMATED COUNT: 12.3 % (ref 11.5–14.5)
GFR SERPL CREATININE-BSD FRML MDRD: 79 ML/MIN/1.73M2
GLUCOSE BLD-MCNC: 109 MG/DL (ref 70–108)
HCT VFR BLD AUTO: 43.5 % (ref 37–47)
HGB BLD-MCNC: 14.2 GM/DL (ref 12–16)
IMMATURE GRANULOCYTES %: 0 %
IMMATURE GRANULOCYTES ABSOLUTE: 0.01 THOU/MM3 (ref 0–0.07)
IONIZED CALCIUM, WHOLE BLOOD: 1.28 MMOL/L (ref 1.12–1.32)
LYMPHOCYTES ABSOLUTE: 1.9 THOU/MM3 (ref 1–4.8)
LYMPHOCYTES NFR BLD AUTO: 27 % (ref 15–47)
MCH RBC QN AUTO: 29.8 PG (ref 26–33)
MCHC RBC AUTO-ENTMCNC: 32.6 GM/DL (ref 32.2–35.5)
MCV RBC AUTO: 91 FL (ref 81–99)
MONOCYTES ABSOLUTE: 0.8 THOU/MM3 (ref 0.4–1.3)
MONOCYTES NFR BLD AUTO: 12 % (ref 0–12)
NEUTROPHILS ABSOLUTE: 4.1 THOU/MM3 (ref 1.8–7.7)
NEUTROPHILS NFR BLD AUTO: 58 % (ref 43–75)
PLATELET # BLD AUTO: 280 THOU/MM3 (ref 130–400)
PMV BLD AUTO: 9.7 FL (ref 9.4–12.4)
POTASSIUM BLD-SCNC: 4 MEQ/L (ref 3.5–4.9)
PROT SERPL-MCNC: 7.2 G/DL (ref 6.4–8.3)
RBC # BLD AUTO: 4.77 MILL/MM3 (ref 4.2–5.4)
SODIUM BLD-SCNC: 135 MEQ/L (ref 138–146)
TOTAL CO2, WHOLE BLOOD: 31 MEQ/L (ref 23–33)
WBC # BLD AUTO: 7.1 THOU/MM3 (ref 4.8–10.8)

## 2025-08-01 PROCEDURE — G8399 PT W/DXA RESULTS DOCUMENT: HCPCS | Performed by: INTERNAL MEDICINE

## 2025-08-01 PROCEDURE — G8419 CALC BMI OUT NRM PARAM NOF/U: HCPCS | Performed by: INTERNAL MEDICINE

## 2025-08-01 PROCEDURE — 80047 BASIC METABLC PNL IONIZED CA: CPT

## 2025-08-01 PROCEDURE — 80076 HEPATIC FUNCTION PANEL: CPT

## 2025-08-01 PROCEDURE — G2211 COMPLEX E/M VISIT ADD ON: HCPCS | Performed by: INTERNAL MEDICINE

## 2025-08-01 PROCEDURE — 85025 COMPLETE CBC W/AUTO DIFF WBC: CPT

## 2025-08-01 PROCEDURE — 3017F COLORECTAL CA SCREEN DOC REV: CPT | Performed by: INTERNAL MEDICINE

## 2025-08-01 PROCEDURE — 36415 COLL VENOUS BLD VENIPUNCTURE: CPT

## 2025-08-01 PROCEDURE — 99214 OFFICE O/P EST MOD 30 MIN: CPT | Performed by: INTERNAL MEDICINE

## 2025-08-01 PROCEDURE — G8427 DOCREV CUR MEDS BY ELIG CLIN: HCPCS | Performed by: INTERNAL MEDICINE

## 2025-08-01 PROCEDURE — 1123F ACP DISCUSS/DSCN MKR DOCD: CPT | Performed by: INTERNAL MEDICINE

## 2025-08-01 PROCEDURE — 1090F PRES/ABSN URINE INCON ASSESS: CPT | Performed by: INTERNAL MEDICINE

## 2025-08-01 PROCEDURE — 1126F AMNT PAIN NOTED NONE PRSNT: CPT | Performed by: INTERNAL MEDICINE

## 2025-08-01 PROCEDURE — 99211 OFF/OP EST MAY X REQ PHY/QHP: CPT

## 2025-08-01 PROCEDURE — 1036F TOBACCO NON-USER: CPT | Performed by: INTERNAL MEDICINE

## 2025-08-01 PROCEDURE — 1159F MED LIST DOCD IN RCRD: CPT | Performed by: INTERNAL MEDICINE

## 2025-08-01 PROCEDURE — G9899 SCRN MAM PERF RSLTS DOC: HCPCS | Performed by: INTERNAL MEDICINE

## 2025-08-01 NOTE — PROGRESS NOTES
[facility] [place] [facility]  [facility]  [address]  [place] [address]  Dept: [phone]  Loc: [phone]   Hematology/Oncology Consult (Clinic)        4/2/25     Capri Esteban   1955     No ref. provider found   Navin Pedraza DO       Reason:  Chief Complaint   Patient presents with    Follow-up     Ductal carcinoma in situ (DCIS) of left breast          HPI:   Ms. Capri Esteban is a 69 y.o. female who is here for initial consultation for left breast DCIS.  She was noted to have a small palpable mass under the nipple areolar complex.  Initial workup revealed 2 areas on the left.  She underwent ultrasound-guided biopsy of the lesion( N+1) w/ a U clip with findings consistent with atypical papillary lesion and another area was benign.  She underwent lumpectomy with findings consistent with DCIS, intermediate nuclear grade,solid and papillary pattern with pagetoid growth and neuroendocrine differentiation( pTis).    As the intraductal carcinoma extends to the disrupted junction of the cranial/posterior margin (slide A5) and is present <2 mm from the cranial, caudal, medial, and anterior margins. She is contemplating with  proceeding with radiation versus simple mastectomy with immediate reconstruction( by ).  She underwent bilateral mastectomies and immediate breast reconstruction(Dr. Cota/ Dr. Gonzalez) on 5/12/2025.    Interval history:  5/30/2025: Postoperative recovery from bilateral mastectomies and immediate breast reconstruction is uneventful.She reports minor pain in the surgical site.      8/1/25   Plans for reconstruction Sept 23rd.  Tolerating Arimidex    Oncology History Overview Note   HISTORY:    According to Dr. Cota note,    2/24/25  \"Capri is a 69 y.o.  female seen in the office for evaluation of a small palpable mass underneath the nipple-areolar complex on the left with an associated mammographic

## 2025-08-01 NOTE — PATIENT INSTRUCTIONS
Return in about 4 months (around 12/1/2025).   Data Unavailable   No orders of the defined types were placed in this encounter.   W/   Dr Delgado

## 2025-08-04 ENCOUNTER — HOSPITAL ENCOUNTER (OUTPATIENT)
Dept: PHYSICAL THERAPY | Age: 70
Setting detail: THERAPIES SERIES
Discharge: HOME | End: 2025-08-04
Attending: INTERNAL MEDICINE
Payer: MEDICARE

## 2025-08-04 PROCEDURE — 97110 THERAPEUTIC EXERCISES: CPT

## 2025-08-06 DIAGNOSIS — Z01.818 PRE-OP EVALUATION: Primary | ICD-10-CM

## 2025-08-26 ENCOUNTER — OFFICE VISIT (OUTPATIENT)
Dept: FAMILY MEDICINE CLINIC | Age: 70
End: 2025-08-26

## 2025-08-26 VITALS
HEART RATE: 68 BPM | BODY MASS INDEX: 26.68 KG/M2 | SYSTOLIC BLOOD PRESSURE: 118 MMHG | RESPIRATION RATE: 16 BRPM | WEIGHT: 150.6 LBS | DIASTOLIC BLOOD PRESSURE: 64 MMHG

## 2025-08-26 DIAGNOSIS — Z85.3 PERSONAL HISTORY OF MALIGNANT NEOPLASM OF BREAST: ICD-10-CM

## 2025-08-26 DIAGNOSIS — K21.9 GASTROESOPHAGEAL REFLUX DISEASE, UNSPECIFIED WHETHER ESOPHAGITIS PRESENT: ICD-10-CM

## 2025-08-26 DIAGNOSIS — Z01.818 PRE-OP EVALUATION: Primary | ICD-10-CM

## 2025-08-26 DIAGNOSIS — Z90.10 ACQUIRED ABSENCE OF BREAST AND ABSENT NIPPLE, UNSPECIFIED LATERALITY: ICD-10-CM

## 2025-08-26 DIAGNOSIS — Z78.0 POST-MENOPAUSAL: ICD-10-CM

## 2025-08-26 RX ORDER — PANTOPRAZOLE SODIUM 40 MG/1
40 TABLET, DELAYED RELEASE ORAL DAILY
Qty: 90 TABLET | Refills: 3 | Status: SHIPPED | OUTPATIENT
Start: 2025-08-26

## 2025-08-26 SDOH — ECONOMIC STABILITY: FOOD INSECURITY: WITHIN THE PAST 12 MONTHS, THE FOOD YOU BOUGHT JUST DIDN'T LAST AND YOU DIDN'T HAVE MONEY TO GET MORE.: NEVER TRUE

## 2025-08-26 SDOH — ECONOMIC STABILITY: FOOD INSECURITY: WITHIN THE PAST 12 MONTHS, YOU WORRIED THAT YOUR FOOD WOULD RUN OUT BEFORE YOU GOT MONEY TO BUY MORE.: NEVER TRUE

## 2025-08-26 ASSESSMENT — PATIENT HEALTH QUESTIONNAIRE - PHQ9
1. LITTLE INTEREST OR PLEASURE IN DOING THINGS: NOT AT ALL
SUM OF ALL RESPONSES TO PHQ QUESTIONS 1-9: 0
2. FEELING DOWN, DEPRESSED OR HOPELESS: NOT AT ALL
SUM OF ALL RESPONSES TO PHQ QUESTIONS 1-9: 0

## 2025-08-26 ASSESSMENT — ENCOUNTER SYMPTOMS
GASTROINTESTINAL NEGATIVE: 1
RESPIRATORY NEGATIVE: 1

## 2025-08-29 ENCOUNTER — HOSPITAL ENCOUNTER (OUTPATIENT)
Dept: GENERAL RADIOLOGY | Age: 70
Discharge: HOME | End: 2025-08-29
Payer: MEDICARE

## 2025-08-29 LAB
EKG ATRIAL RATE: 69 BPM
EKG P AXIS: 64 DEGREES
EKG P-R INTERVAL: 144 MS
EKG Q-T INTERVAL: 420 MS
EKG QRS DURATION: 76 MS
EKG QTC CALCULATION (BAZETT): 450 MS
EKG R AXIS: 36 DEGREES
EKG T AXIS: 9 DEGREES
EKG VENTRICULAR RATE: 69 BPM

## 2025-08-29 PROCEDURE — 93010 ELECTROCARDIOGRAM REPORT: CPT | Performed by: INTERNAL MEDICINE

## 2025-08-29 PROCEDURE — 93005 ELECTROCARDIOGRAM TRACING: CPT | Performed by: FAMILY MEDICINE

## (undated) DEVICE — Device

## (undated) DEVICE — BANDAGE,GAUZE,4.5"X4.1YD,STERILE,LF: Brand: MEDLINE

## (undated) DEVICE — SUTURE VICRYL + SZ 4-0 L27IN ABSRB WHT FS-2 3/8 CIR REV CUT VCP422H

## (undated) DEVICE — SUTURE PERMA-HAND SZ 2-0 L30IN NONABSORBABLE BLK L26MM SH K833H

## (undated) DEVICE — DRESSING,GAUZE,XEROFORM,CURAD,5"X9",ST: Brand: CURAD

## (undated) DEVICE — EVACUATOR SURG 100CC SIL BLB SUCT RESVR FOR CLS WND DRNGE

## (undated) DEVICE — APPLIER LIG CLP M L11IN TI STR RNG HNDL FOR 20 CLP DISP

## (undated) DEVICE — SUTURE MONOCRYL + SZ 3-0 L27IN ABSRB UD L26MM SH 1/2 CIR MCP416H

## (undated) DEVICE — BANDAGE COMPR M W4INXL10YD WHT BGE VELC E MTRX HK AND LOOP

## (undated) DEVICE — APPLICATOR MEDICATED 26 CC SOLUTION CLR STRL CHLORAPREP

## (undated) DEVICE — YANKAUER,BULB TIP,W/O VENT,RIGID,STERILE: Brand: MEDLINE

## (undated) DEVICE — PENCIL SMK EVAC 15FT BLADE ELECTRD ROCKER F/TELSCP

## (undated) DEVICE — SPECIMEN ORIENTATION CHARMS, SIX DISTINCTLY SHAPED STERILE 10MM CHARMS: Brand: MARGINMAP

## (undated) DEVICE — HYPODERMIC SAFETY NEEDLE: Brand: MAGELLAN

## (undated) DEVICE — DRAIN,WOUND,15FR,3/16,FULL-FLUTED: Brand: MEDLINE

## (undated) DEVICE — GLOVE ORANGE PI 7   MSG9070

## (undated) DEVICE — DRESSING PETRO W3XL8IN OIL EMUL N ADH GZ KNIT IMPREG CELOS

## (undated) DEVICE — SUTURE VICRYL + SZ 0 L27IN ABSRB UD L36MM CT-1 1/2 CIR VCPP41D

## (undated) DEVICE — LIQUIBAND RAPID ADHESIVE 36/CS 0.8ML: Brand: MEDLINE

## (undated) DEVICE — GLOVE SURG SZ 65 L12IN THK75MIL DK GRN LTX FREE

## (undated) DEVICE — SYRINGE MED 10ML LUERLOCK TIP W/O SFTY DISP

## (undated) DEVICE — SPONGE GZ W4XL4IN COT 12 PLY TYP VII WVN C FLD DSGN STERILE

## (undated) DEVICE — PENCIL SMK EVAC 10 FT BLADE ELECTRD ROCKER FOR TELSCP

## (undated) DEVICE — GLOVE ORANGE PI 7 1/2   MSG9075

## (undated) DEVICE — MARKER,SKIN,WI/RULER AND LABELS: Brand: MEDLINE

## (undated) DEVICE — DRAIN CHN 19FR L0.25IN DIA6.3MM SIL RND HUBLESS FULL FLUT

## (undated) DEVICE — SYSTEM IMPL DEL FOR BRST IMPL FUN (SEE COMMENT)

## (undated) DEVICE — GLOVE ORANGE PI 8 1/2   MSG9085

## (undated) DEVICE — INTENDED FOR TISSUE SEPARATION, AND OTHER PROCEDURES THAT REQUIRE A SHARP SURGICAL BLADE TO PUNCTURE OR CUT.: Brand: BARD-PARKER ® CARBON RIB-BACK BLADES

## (undated) DEVICE — SUTURE VICRYL + SZ 3-0 L27IN ABSRB UD L26MM SH 1/2 CIR VCP416H

## (undated) DEVICE — DRAPE,TOP,102X53,STERILE: Brand: MEDLINE

## (undated) DEVICE — SYRINGE IRRIG 60ML SFT PLIABLE BLB EZ TO GRP 1 HND USE W/

## (undated) DEVICE — SYRINGE MED 50ML LUERLOCK TIP

## (undated) DEVICE — PACK PROCEDURE SURG PLAS SC MIN SRHP LF

## (undated) DEVICE — GLOVE SURG SZ 65 THK91MIL LTX FREE SYN POLYISOPRENE

## (undated) DEVICE — BLADE ES L4IN INSUL EDGE

## (undated) DEVICE — SILTEX MEDIUM HEIGHT TISSUE EXPANDER STYLE 9200, 550CC: Brand: MENTOR CPX 4 BREAST TISSUE EXPANDER WITH SUTURE TABS

## (undated) DEVICE — SHEET, ORTHO, SPLIT, STERILE: Brand: MEDLINE

## (undated) DEVICE — GOWN,SIRUS,NONRNF,SETINSLV,XL,20/CS: Brand: MEDLINE

## (undated) DEVICE — SPONGE LAP W18XL18IN WHT COT 4 PLY FLD STRUNG RADPQ DISP ST 2 PER PACK

## (undated) DEVICE — SET UP: Brand: MEDLINE INDUSTRIES, INC.

## (undated) DEVICE — SPONGE GZ W4XL4IN COT 12 PLY TYP VII WVN C FLD DSGN

## (undated) DEVICE — COUNTER NDL 40 COUNT HLD 70 FOAM BLK ADH W/ MAG

## (undated) DEVICE — TUBING, SUCTION, 1/4" X 12', STRAIGHT: Brand: MEDLINE

## (undated) DEVICE — SPONGE LAP W18XL18IN WHT COT 4 PLY FLD STRUNG RADPQ DISP ST

## (undated) DEVICE — SOLUTION IV 1000ML 0.9% SOD CHL PH 5 INJ USP VIAFLX PLAS

## (undated) DEVICE — 450 ML BOTTLE OF 0.05% CHLORHEXIDINE GLUCONATE IN 99.95% STERILE WATER FOR IRRIGATION, USP AND APPLICATOR.: Brand: IRRISEPT ANTIMICROBIAL WOUND LAVAGE

## (undated) DEVICE — BREAST HERNIA: Brand: MEDLINE INDUSTRIES, INC.

## (undated) DEVICE — PROVE COVER: Brand: UNBRANDED

## (undated) DEVICE — GLOVE SURG SZ 8 L11.77IN FNGR THK9.8MIL STRW LTX POLYMER

## (undated) DEVICE — BRA COMPR LG NYL LYCRA SPANDEX WHT CURAD

## (undated) DEVICE — GAUZE,SPONGE,4"X4",12PLY,STERILE,LF,2'S: Brand: MEDLINE

## (undated) DEVICE — PAD,ABDOMINAL,5"X9",ST,LF,25/BX: Brand: MEDLINE INDUSTRIES, INC.

## (undated) DEVICE — PACK,UNIVERSAL,NO GOWNS: Brand: MEDLINE

## (undated) DEVICE — SYRINGE MED 30ML STD CLR PLAS LUERLOCK TIP N CTRL DISP

## (undated) DEVICE — GOWN,SIRUS,NON REINFRCD,LARGE,SET IN SL: Brand: MEDLINE

## (undated) DEVICE — SPONGE DRN W4XL4IN RAYON/POLYESTER 6 PLY NONWOVEN PRECUT 2 PER PK